# Patient Record
Sex: MALE | Race: BLACK OR AFRICAN AMERICAN | NOT HISPANIC OR LATINO | Employment: OTHER | URBAN - METROPOLITAN AREA
[De-identification: names, ages, dates, MRNs, and addresses within clinical notes are randomized per-mention and may not be internally consistent; named-entity substitution may affect disease eponyms.]

---

## 2017-07-24 ENCOUNTER — HISTORICAL (OUTPATIENT)
Dept: ENDOSCOPY | Facility: HOSPITAL | Age: 57
End: 2017-07-24

## 2017-07-26 ENCOUNTER — HISTORICAL (OUTPATIENT)
Dept: ENDOSCOPY | Facility: HOSPITAL | Age: 57
End: 2017-07-26

## 2018-05-16 ENCOUNTER — HISTORICAL (OUTPATIENT)
Dept: CARDIOLOGY | Facility: HOSPITAL | Age: 58
End: 2018-05-16

## 2018-05-16 LAB
ABS NEUT (OLG): 6.1 X10(3)/MCL (ref 2.1–9.2)
APTT PPP: 30.5 SECOND(S) (ref 24.8–36.9)
BASOPHILS # BLD AUTO: 0 X10(3)/MCL (ref 0–0.2)
BASOPHILS NFR BLD AUTO: 0 %
BUN SERPL-MCNC: 20 MG/DL (ref 7–18)
CALCIUM SERPL-MCNC: 9.3 MG/DL (ref 8.5–10.1)
CHLORIDE SERPL-SCNC: 108 MMOL/L (ref 98–107)
CO2 SERPL-SCNC: 24 MMOL/L (ref 21–32)
CREAT SERPL-MCNC: 1.03 MG/DL (ref 0.7–1.3)
CREAT/UREA NIT SERPL: 19.4
ERYTHROCYTE [DISTWIDTH] IN BLOOD BY AUTOMATED COUNT: 12.4 % (ref 11.5–17)
GLUCOSE SERPL-MCNC: 122 MG/DL (ref 74–106)
HCT VFR BLD AUTO: 37.9 % (ref 42–52)
HGB BLD-MCNC: 12.6 GM/DL (ref 14–18)
INR PPP: 0.88 (ref 0–1.27)
LYMPHOCYTES # BLD AUTO: 0.9 X10(3)/MCL (ref 0.6–4.6)
LYMPHOCYTES NFR BLD AUTO: 12 %
MCH RBC QN AUTO: 30.9 PG (ref 27–31)
MCHC RBC AUTO-ENTMCNC: 33.2 GM/DL (ref 33–36)
MCV RBC AUTO: 92.9 FL (ref 80–94)
MONOCYTES # BLD AUTO: 0.2 X10(3)/MCL (ref 0.1–1.3)
MONOCYTES NFR BLD AUTO: 2 %
NEUTROPHILS # BLD AUTO: 6.1 X10(3)/MCL (ref 2.1–9.2)
NEUTROPHILS NFR BLD AUTO: 85 %
PLATELET # BLD AUTO: 177 X10(3)/MCL (ref 130–400)
PMV BLD AUTO: 10.1 FL (ref 9.4–12.4)
POTASSIUM SERPL-SCNC: 4.5 MMOL/L (ref 3.5–5.1)
PROTHROMBIN TIME: 12.2 SECOND(S) (ref 12.2–14.7)
RBC # BLD AUTO: 4.08 X10(6)/MCL (ref 4.7–6.1)
SODIUM SERPL-SCNC: 139 MMOL/L (ref 136–145)
WBC # SPEC AUTO: 7.2 X10(3)/MCL (ref 4.5–11.5)

## 2018-06-13 ENCOUNTER — TELEPHONE (OUTPATIENT)
Dept: PHARMACY | Facility: CLINIC | Age: 58
End: 2018-06-13

## 2018-06-14 NOTE — TELEPHONE ENCOUNTER
Documentation Only:    Faxed Prior Authorization form and supporting documentation for Praluent to insurance on 06/14/2018.

## 2018-07-12 NOTE — TELEPHONE ENCOUNTER
2nd attempt: LM with Eunice at [facility] about Praluent Denial. Denial letter faxed on 6/25/18 - plan requires 6 month trial of Zetia. Pending MD response about how to proceed. TTN

## 2020-03-03 ENCOUNTER — HISTORICAL (OUTPATIENT)
Dept: CARDIOLOGY | Facility: HOSPITAL | Age: 60
End: 2020-03-03

## 2020-03-03 LAB
ABS NEUT (OLG): 6.55 X10(3)/MCL (ref 2.1–9.2)
BASOPHILS # BLD AUTO: 0 X10(3)/MCL (ref 0–0.2)
BASOPHILS NFR BLD AUTO: 0 %
BUN SERPL-MCNC: 13 MG/DL (ref 7–18)
CALCIUM SERPL-MCNC: 8.9 MG/DL (ref 8.5–10.1)
CHLORIDE SERPL-SCNC: 107 MMOL/L (ref 98–107)
CO2 SERPL-SCNC: 23 MMOL/L (ref 21–32)
CREAT SERPL-MCNC: 1.11 MG/DL (ref 0.7–1.3)
CREAT/UREA NIT SERPL: 11.7
EOSINOPHIL # BLD AUTO: 0 X10(3)/MCL (ref 0–0.9)
EOSINOPHIL NFR BLD AUTO: 0 %
ERYTHROCYTE [DISTWIDTH] IN BLOOD BY AUTOMATED COUNT: 13 % (ref 11.5–17)
GLUCOSE SERPL-MCNC: 167 MG/DL (ref 74–106)
HCT VFR BLD AUTO: 38.6 % (ref 42–52)
HGB BLD-MCNC: 13 GM/DL (ref 14–18)
INR PPP: 0.9 (ref 0–1.3)
LYMPHOCYTES # BLD AUTO: 0.8 X10(3)/MCL (ref 0.6–4.6)
LYMPHOCYTES NFR BLD AUTO: 10 %
MCH RBC QN AUTO: 31.5 PG (ref 27–31)
MCHC RBC AUTO-ENTMCNC: 33.7 GM/DL (ref 33–36)
MCV RBC AUTO: 93.5 FL (ref 80–94)
MONOCYTES # BLD AUTO: 0.4 X10(3)/MCL (ref 0.1–1.3)
MONOCYTES NFR BLD AUTO: 5 %
NEUTROPHILS # BLD AUTO: 6.55 X10(3)/MCL (ref 2.1–9.2)
NEUTROPHILS NFR BLD AUTO: 84 %
PLATELET # BLD AUTO: 154 X10(3)/MCL (ref 130–400)
PMV BLD AUTO: 10.4 FL (ref 9.4–12.4)
POTASSIUM SERPL-SCNC: 4.5 MMOL/L (ref 3.5–5.1)
PROTHROMBIN TIME: 11.5 SECOND(S) (ref 11.1–13.7)
RBC # BLD AUTO: 4.13 X10(6)/MCL (ref 4.7–6.1)
SODIUM SERPL-SCNC: 137 MMOL/L (ref 136–145)
WBC # SPEC AUTO: 7.8 X10(3)/MCL (ref 4.5–11.5)

## 2020-11-11 ENCOUNTER — HISTORICAL (OUTPATIENT)
Dept: ADMINISTRATIVE | Facility: HOSPITAL | Age: 60
End: 2020-11-11

## 2020-11-12 LAB
ABS NEUT (OLG): 9.94 X10(3)/MCL (ref 2.1–9.2)
ALBUMIN SERPL-MCNC: 3.6 GM/DL (ref 3.4–4.8)
ALBUMIN/GLOB SERPL: 1.2 RATIO (ref 1.1–2)
ALP SERPL-CCNC: 50 UNIT/L (ref 40–150)
ALT SERPL-CCNC: 21 UNIT/L (ref 0–55)
AST SERPL-CCNC: 21 UNIT/L (ref 5–34)
BASOPHILS # BLD AUTO: 0 X10(3)/MCL (ref 0–0.2)
BASOPHILS NFR BLD AUTO: 0 %
BILIRUB SERPL-MCNC: 0.3 MG/DL
BILIRUBIN DIRECT+TOT PNL SERPL-MCNC: 0.1 MG/DL (ref 0–0.5)
BILIRUBIN DIRECT+TOT PNL SERPL-MCNC: 0.2 MG/DL (ref 0–0.8)
BUN SERPL-MCNC: 17.8 MG/DL (ref 8.4–25.7)
CALCIUM SERPL-MCNC: 8.2 MG/DL (ref 8.8–10)
CHLORIDE SERPL-SCNC: 110 MMOL/L (ref 98–107)
CO2 SERPL-SCNC: 22 MMOL/L (ref 23–31)
CREAT SERPL-MCNC: 1 MG/DL (ref 0.73–1.18)
ERYTHROCYTE [DISTWIDTH] IN BLOOD BY AUTOMATED COUNT: 12.9 % (ref 11.5–17)
GLOBULIN SER-MCNC: 2.9 GM/DL (ref 2.4–3.5)
GLUCOSE SERPL-MCNC: 145 MG/DL (ref 82–115)
HCT VFR BLD AUTO: 38.3 % (ref 42–52)
HGB BLD-MCNC: 12.7 GM/DL (ref 14–18)
LYMPHOCYTES # BLD AUTO: 0.8 X10(3)/MCL (ref 0.6–4.6)
LYMPHOCYTES NFR BLD AUTO: 7 %
MCH RBC QN AUTO: 31 PG (ref 27–31)
MCHC RBC AUTO-ENTMCNC: 33.2 GM/DL (ref 33–36)
MCV RBC AUTO: 93.4 FL (ref 80–94)
MONOCYTES # BLD AUTO: 0.9 X10(3)/MCL (ref 0.1–1.3)
MONOCYTES NFR BLD AUTO: 7 %
NEUTROPHILS # BLD AUTO: 9.94 X10(3)/MCL (ref 2.1–9.2)
NEUTROPHILS NFR BLD AUTO: 85 %
PLATELET # BLD AUTO: 157 X10(3)/MCL (ref 130–400)
PMV BLD AUTO: 10.7 FL (ref 9.4–12.4)
POTASSIUM SERPL-SCNC: 4.7 MMOL/L (ref 3.5–5.1)
PROT SERPL-MCNC: 6.5 GM/DL (ref 5.8–7.6)
RBC # BLD AUTO: 4.1 X10(6)/MCL (ref 4.7–6.1)
SODIUM SERPL-SCNC: 141 MMOL/L (ref 136–145)
WBC # SPEC AUTO: 11.7 X10(3)/MCL (ref 4.5–11.5)

## 2020-12-10 PROBLEM — I73.9 PAD (PERIPHERAL ARTERY DISEASE): Status: ACTIVE | Noted: 2020-12-10

## 2021-01-14 PROBLEM — I70.219 ATHEROSCLEROSIS OF NATIVE ARTERIES OF EXTREMITY WITH INTERMITTENT CLAUDICATION: Status: ACTIVE | Noted: 2021-01-14

## 2021-09-13 ENCOUNTER — HISTORICAL (OUTPATIENT)
Dept: LAB | Facility: HOSPITAL | Age: 61
End: 2021-09-13

## 2021-09-13 LAB
ABS NEUT (OLG): 3.8 X10(3)/MCL (ref 2.1–9.2)
ALBUMIN SERPL-MCNC: 4.1 GM/DL (ref 3.4–4.8)
ALBUMIN/GLOB SERPL: 1.2 RATIO (ref 1.1–2)
ALP SERPL-CCNC: 54 UNIT/L (ref 40–150)
ALT SERPL-CCNC: 22 UNIT/L (ref 0–55)
AST SERPL-CCNC: 21 UNIT/L (ref 5–34)
BILIRUB SERPL-MCNC: 0.3 MG/DL (ref 0.2–1.2)
BILIRUBIN DIRECT+TOT PNL SERPL-MCNC: 0.1 MG/DL (ref 0–0.5)
BILIRUBIN DIRECT+TOT PNL SERPL-MCNC: 0.2 MG/DL (ref 0–0.8)
BUN SERPL-MCNC: 16.5 MG/DL (ref 8.4–25.7)
CALCIUM SERPL-MCNC: 10 MG/DL (ref 8.8–10)
CHLORIDE SERPL-SCNC: 109 MMOL/L (ref 98–107)
CHOLEST SERPL-MCNC: 208 MG/DL
CHOLEST/HDLC SERPL: 5 {RATIO} (ref 0–5)
CO2 SERPL-SCNC: 22 MMOL/L (ref 23–31)
CREAT SERPL-MCNC: 1.1 MG/DL (ref 0.72–1.25)
ERYTHROCYTE [DISTWIDTH] IN BLOOD BY AUTOMATED COUNT: 12.8 % (ref 11.5–17)
GLOBULIN SER-MCNC: 3.4 GM/DL (ref 2.4–3.5)
GLUCOSE SERPL-MCNC: 95 MG/DL (ref 82–115)
HCT VFR BLD AUTO: 40.1 % (ref 42–52)
HDLC SERPL-MCNC: 41 MG/DL (ref 40–60)
HGB BLD-MCNC: 13.6 GM/DL (ref 14–18)
LDLC SERPL CALC-MCNC: 108 MG/DL (ref 50–140)
MCH RBC QN AUTO: 31.9 PG (ref 27–31)
MCHC RBC AUTO-ENTMCNC: 33.9 GM/DL (ref 33–36)
MCV RBC AUTO: 93.9 FL (ref 80–94)
NRBC BLD AUTO-RTO: 0 % (ref 0–0.2)
PLATELET # BLD AUTO: 157 X10(3)/MCL (ref 130–400)
PMV BLD AUTO: 10.3 FL (ref 7.4–10.4)
POTASSIUM SERPL-SCNC: 4.7 MMOL/L (ref 3.5–5.1)
PROT SERPL-MCNC: 7.5 GM/DL (ref 5.8–7.6)
RBC # BLD AUTO: 4.27 X10(6)/MCL (ref 4.7–6.1)
SODIUM SERPL-SCNC: 142 MMOL/L (ref 136–145)
TRIGL SERPL-MCNC: 297 MG/DL (ref 0–150)
VLDLC SERPL CALC-MCNC: 59 MG/DL
WBC # SPEC AUTO: 6.5 X10(3)/MCL (ref 4.5–11.5)

## 2022-04-11 ENCOUNTER — HISTORICAL (OUTPATIENT)
Dept: ADMINISTRATIVE | Facility: HOSPITAL | Age: 62
End: 2022-04-11

## 2022-04-28 VITALS
DIASTOLIC BLOOD PRESSURE: 88 MMHG | BODY MASS INDEX: 27.56 KG/M2 | HEIGHT: 65 IN | WEIGHT: 165.38 LBS | SYSTOLIC BLOOD PRESSURE: 141 MMHG

## 2022-04-30 NOTE — H&P
Patient:   Yamil Escobedo             MRN: [id number]            FIN: [id number]               Age:   56 years     Sex:  Male     :  1960   Associated Diagnoses:   None   Author:   Faizan Bunch MD      56-year-old man with a history of coronary artery disease with multiple stents, peripheral vascular disease with multiple stents, CABG here for EGD for long-term GERD and epigastric discomfort.  He does have some postprandial burning and regurgitation along with belching and bloating.  He has been on Nexium for a few months and this has helped some but not enough.  He denies any dysphagia.  No prior EGDs.  He says he stopped his Pradaxa 2 days ago and he stopped his Plavix 5 days ago.      Review of Systems   Constitutional:  Negative except as documented in history of present illness.    Respiratory:  Negative except as documented in history of present illness.    Cardiovascular:  Negative except as documented in history of present illness.       Health Status   Allergies:    Allergic Reactions (Selected)  Severe  Iodine- Anaphylactic reaction-code blue and rash.  Severity Not Documented  Ceclor- Rash.  Ciprofloxacin- No reactions were documented.   Current medications:  (Selected)   Prescriptions  Prescribed  Aldactone 25 mg oral tablet: 25 mg = 1 tab(s), Oral, Daily, # 30 tab(s), 11 Refill(s)  Pradaxa 150 mg oral capsule: 150 mg = 1 cap(s), Oral, BID, # 60 cap(s), 0 Refill(s), other reason (Rx)  lisinopril 2.5 mg oral tablet: 2.5 mg = 1 tab(s), Oral, Daily, # 30 tab(s), 11 Refill(s)  nitroglycerin 0.4 mg sublingual TAB: 0.4 mg = 1 tab(s), SL, q5min, PRN PRN chest pain, # 100 tab(s), 11 Refill(s)  Documented Medications  Documented  FLUoxetine 20 mg oral tablet: 20 mg = 1 tab(s), Oral, Daily, # 30 tab(s), 0 Refill(s)  ISOSORB MONO TAB 30MG ER: 30 mg = 1 tab(s), Oral, Daily  Lasix 20 mg oral tablet: 40 mg = 2 tab(s), Oral, Daily, PRN PRN edema, # 30 tab(s), 0 Refill(s), Patient Verbalizes  Understanding  Nitro-Bid 2% transdermal oint: 0.5 in, TOP, At Bedtime, 0 Refill(s)  Plavix 75 mg oral tablet: 75 mg = 1 tab(s), Oral, Daily, # 30 tab(s), 0 Refill(s)  Zantac: = 1 tab(s), Oral, TID, 0 Refill(s)  alPRAzolam 1 mg oral tablet: 1 mg = 1 tab(s), Oral, BID, PRN PRN for anxiety, 0 Refill(s)  amitriptyline 25 mg oral tablet: 50 mg = 2 tab(s), Oral, Once a day (at bedtime), # 180 tab(s), 0 Refill(s)  amlodipine 10 mg oral tablet: 10 mg = 1 tab(s), Oral, Daily, # 30 tab(s), 0 Refill(s)  aspirin 81 mg oral Delayed Release (EC) tablet: 81 mg = 1 tab(s), Oral, Daily, # 30 tab(s), 0 Refill(s)  atorvastatin 80 mg oral tablet: 80 mg = 1 tab(s), Oral, At Bedtime, # 90 tab(s), 0 Refill(s)  carvedilol 12.5 mg oral tablet: 12.5 mg = 1 tab(s), Oral, BID, # 60 tab(s), 0 Refill(s)  esomeprazole 40 mg oral DR capsule (pt. own): 40 mg = 1 cap(s), Oral, Daily, # 30 cap(s), 0 Refill(s)  hydrALAZINE 50 mg oral tablet: 50 mg = 1 tab(s), Oral, TID, # 90 tab(s), 0 Refill(s)  nitroglycerin 0.4 mg sublingual TAB: 0.4 mg = 1 tab(s), SL, q5min, PRN PRN chest pain, 0 Refill(s)  zinc (as gluconate) 50 mg oral tablet: = 1 tab(s), Oral, Daily, # 30 tab(s), 0 Refill(s)      Histories   Past Medical History:    Active  High blood pressure ([id number]): Onset in 1987 at 27 years.  Comments:  1/20/2016 CST 21:36 ANJUM - Jaya MARS, Bertha MARTINEZ  on htn med   Procedure history:    PTCA - Percutaneous transluminal coronary angioplasty ([id number]) on 3/19/2015 at 54 Years.  Comments:  11/17/2015 11:29 - Nancy Rowley  x3  re-op right fem-pop bypass/profundaplasty on 8/8/2014 at 53 Years.  Above knee femoral-popliteal artery bypass using autogenous vein graft ([id number]) on 9/5/2013 at 52 Years.  Comments:  11/17/2015 11:29 - Nancy Rowley  right  Above knee femoral-popliteal artery bypass using autogenous vein graft ([id number]) on 6/4/2013 at 52 Years.  Comments:  11/17/2015 11:30 - Nancy Rowley  left  Above knee femoral-popliteal  artery bypass using autogenous vein graft ([id number]) on 6/25/2010 at 49 Years.  CABG x 4 - Coronary artery bypass grafts x 4 ([id number]) on 3/6/2009 at 48 Years.  cardiac ablation x2.  Appendectomy (225).  Coronary artery bypass grafts x 5 ([id number]).  Femoral-popliteal artery bypass graft with vein ([id number]).      Physical Examination      Vital Signs (last 24 hrs)_____  Last Charted___________  Temp Tympanic    36.5 DegC  (JUL 24 07:03)  Resp Rate         18 br/min  (JUL 24 07:03)  SBP      136 mmHg  (JUL 24 07:03)  DBP      74 mmHg  (JUL 24 07:03)  SpO2      100 %  (JUL 24 07:03)     General:  Alert and oriented, No acute distress.    Eye:  Pupils are equal, round and reactive to light.    HENT:  Oral mucosa is moist.    Neck:  Supple.    Respiratory:  Respirations are non-labored, Symmetrical chest wall expansion.    Cardiovascular:  S1, S2.    Gastrointestinal:  Soft, Non-tender, Non-distended, Normal bowel sounds.    Neurologic:  Alert, Oriented.    Psychiatric:  Cooperative, Appropriate mood & affect.       Review / Management   Results review:     No qualifying data available.       Impression and Plan   1. GERD---plan  for EGD.

## 2022-04-30 NOTE — H&P
Patient:   Yamil Escobedo             MRN: [id number]            FIN: [id number]               Age:   56 years     Sex:  Male     :  1960   Associated Diagnoses:   None   Author:   Faizan Bunch MD      56-year-old man with coronary artery disease, peripheral vascular disease and multiple stents on aspirin and Plavix and Pradaxa who is here for colonoscopy for rectal bleeding.  He is also never had a screening.  The rectal bleeding is been going on for a month or 2 intermittently.  He has stopped his Plavix and Pradaxa for the past few days.  He was recently here for an EGD which was unremarkable except for gastritis.  He denies any family history of colon cancer.      Review of Systems   Constitutional:  Negative except as documented in history of present illness.    Ear/Nose/Mouth/Throat:  Negative except as documented in history of present illness.    Respiratory:  Negative except as documented in history of present illness.    Cardiovascular:  Negative except as documented in history of present illness.       Health Status   Allergies:    Allergic Reactions (Selected)  Severe  Iodine- Anaphylactic reaction-code blue and rash.  Severity Not Documented  Ceclor- Rash.  Ciprofloxacin- No reactions were documented.   Current medications:  (Selected)   Inpatient Medications  Ordered  Buffered Lidocaine 1% - 1mL syringe: 0.5 mL, 5 mg =, form: Injection, ID, As Directed PRN for other (see comment), first dose 17 11:29:00 CDT, May inject 0.5mL at IV site, if not allergic  Plasmalyte 1,000 mL: 1,000 mL, 1,000 mL, IV, 20 mL/hr, start date 17 11:29:00 CDT  citric acid-sodium citrate 334 mg-500 mg/5 mL oral solution: 30 mL, form: Soln, Oral, Once, first dose 17 9:00:00 CDT, stop date 17 9:00:00 CDT, administer if obese (BMI>30) or diabetic or has GERD/reflux or is not NPO  midazolam: 2 mg, form: Injection, IV Push, q5min, Order duration: 2 dose(s), first dose 17 11:29:00  CDT, stop date 07/26/17 11:38:00 CDT, (up to 5 mg for moderate anxiety), 30,025  Prescriptions  Prescribed  Aldactone 25 mg oral tablet: 25 mg = 1 tab(s), Oral, Daily, # 30 tab(s), 11 Refill(s)  Pradaxa 150 mg oral capsule: 150 mg = 1 cap(s), Oral, BID, # 60 cap(s), 0 Refill(s), other reason (Rx)  lisinopril 2.5 mg oral tablet: 2.5 mg = 1 tab(s), Oral, Daily, # 30 tab(s), 11 Refill(s)  nitroglycerin 0.4 mg sublingual TAB: 0.4 mg = 1 tab(s), SL, q5min, PRN PRN chest pain, # 100 tab(s), 11 Refill(s)  Documented Medications  Documented  FLUoxetine 20 mg oral tablet: 20 mg = 1 tab(s), Oral, Daily, # 30 tab(s), 0 Refill(s)  ISOSORB MONO TAB 30MG ER: 30 mg = 1 tab(s), Oral, Daily  Lasix 20 mg oral tablet: 40 mg = 2 tab(s), Oral, Daily, PRN PRN edema, # 30 tab(s), 0 Refill(s), Patient Verbalizes Understanding  Nitro-Bid 2% transdermal oint: 0.5 in, TOP, At Bedtime, 0 Refill(s)  Plavix 75 mg oral tablet: 75 mg = 1 tab(s), Oral, Daily, # 30 tab(s), 0 Refill(s)  Zantac: = 1 tab(s), Oral, TID, 0 Refill(s)  alPRAzolam 1 mg oral tablet: 1 mg = 1 tab(s), Oral, BID, PRN PRN for anxiety, 0 Refill(s)  amitriptyline 25 mg oral tablet: 50 mg = 2 tab(s), Oral, Once a day (at bedtime), # 180 tab(s), 0 Refill(s)  amlodipine 10 mg oral tablet: 10 mg = 1 tab(s), Oral, Daily, # 30 tab(s), 0 Refill(s)  aspirin 81 mg oral Delayed Release (EC) tablet: 81 mg = 1 tab(s), Oral, Daily, # 30 tab(s), 0 Refill(s)  atorvastatin 80 mg oral tablet: 80 mg = 1 tab(s), Oral, At Bedtime, # 90 tab(s), 0 Refill(s)  carvedilol 12.5 mg oral tablet: 12.5 mg = 1 tab(s), Oral, BID, # 60 tab(s), 0 Refill(s)  esomeprazole 40 mg oral DR capsule (pt. own): 40 mg = 1 cap(s), Oral, Daily, # 30 cap(s), 0 Refill(s)  hydrALAZINE 50 mg oral tablet: 50 mg = 1 tab(s), Oral, TID, # 90 tab(s), 0 Refill(s)  nitroglycerin 0.4 mg sublingual TAB: 0.4 mg = 1 tab(s), SL, q5min, PRN PRN chest pain, 0 Refill(s)  zinc (as gluconate) 50 mg oral tablet: = 1 tab(s), Oral, Daily, # 30  tab(s), 0 Refill(s)      Histories   Past Medical History:    Active  High blood pressure ([id number]): Onset in 1987 at 27 years.  Comments:  1/20/2016 CST 21:36 CST - Jaya MARS, Bertha MARTINEZ  on htn med   Procedure history:    Esophagogastroduodenoscopy on 7/24/2017 at 56 Years.  Comments:  7/24/2017 08:00 - Romain Albrecht RN  auto-populated from documented surgical case  Biopsy Gastrointestinal on 7/24/2017 at 56 Years.  Comments:  7/24/2017 08:00 - Romain Albrecht RN  auto-populated from documented surgical case  PTCA - Percutaneous transluminal coronary angioplasty ([id number]) on 3/19/2015 at 54 Years.  Comments:  11/17/2015 11:29 - Nancy Rowley  x3  re-op right fem-pop bypass/profundaplasty on 8/8/2014 at 53 Years.  Above knee femoral-popliteal artery bypass using autogenous vein graft ([id number]) on 9/5/2013 at 52 Years.  Comments:  11/17/2015 11:29 - Nancy Rowley  right  Above knee femoral-popliteal artery bypass using autogenous vein graft ([id number]) on 6/4/2013 at 52 Years.  Comments:  11/17/2015 11:30 - Nancy Rowley.  left  Above knee femoral-popliteal artery bypass using autogenous vein graft ([id number]) on 6/25/2010 at 49 Years.  CABG x 4 - Coronary artery bypass grafts x 4 ([id number]) on 3/6/2009 at 48 Years.  cardiac ablation x2.  Appendectomy (225).  Coronary artery bypass grafts x 5 ([id number]).  Femoral-popliteal artery bypass graft with vein ([id number]).      Physical Examination      Vital Signs (last 24 hrs)_____  Last Charted___________  Temp Tympanic    36.3 DegC  (JUL 26 11:21)  Heart Rate Peripheral   76 bpm  (JUL 26 11:21)  Resp Rate         21 br/min  (JUL 26 11:21)  SBP      138 mmHg  (JUL 26 11:21)  DBP      90 mmHg  (JUL 26 11:21)  SpO2      100 %  (JUL 26 11:21)     General:  Alert and oriented, No acute distress.    Eye:  Pupils are equal, round and reactive to light.    HENT:  Oral mucosa is moist.    Neck:  Supple.    Respiratory:  Respirations are non-labored,  Symmetrical chest wall expansion.    Cardiovascular:  S1, S2.    Gastrointestinal:  Soft, Non-tender, Non-distended, Normal bowel sounds.    Neurologic:  Alert, Oriented.    Psychiatric:  Cooperative, Appropriate mood & affect.       Review / Management   Results review:     No qualifying data available.       Impression and Plan   Rectal bleeding-----this is likely hemorrhoidal in the setting of Plavix and Pradaxa    Plan for colonoscopy

## 2023-04-08 ENCOUNTER — HOSPITAL ENCOUNTER (INPATIENT)
Facility: HOSPITAL | Age: 63
LOS: 6 days | Discharge: HOME | DRG: 253 | End: 2023-04-14
Attending: EMERGENCY MEDICINE | Admitting: INTERNAL MEDICINE
Payer: MEDICARE

## 2023-04-08 DIAGNOSIS — T82.898S: Primary | ICD-10-CM

## 2023-04-08 DIAGNOSIS — I74.9 EMBOLISM AND THROMBOSIS: ICD-10-CM

## 2023-04-08 DIAGNOSIS — N17.9 AKI (ACUTE KIDNEY INJURY): ICD-10-CM

## 2023-04-08 DIAGNOSIS — I73.9 PAD (PERIPHERAL ARTERY DISEASE): ICD-10-CM

## 2023-04-08 DIAGNOSIS — M79.606 LEG PAIN: ICD-10-CM

## 2023-04-08 LAB
ALBUMIN SERPL-MCNC: 4.2 G/DL (ref 3.4–4.8)
ALBUMIN/GLOB SERPL: 1.8 RATIO (ref 1.1–2)
ALP SERPL-CCNC: 46 UNIT/L (ref 40–150)
ALT SERPL-CCNC: 26 UNIT/L (ref 0–55)
APTT PPP: 139.3 SECONDS (ref 23.2–33.7)
APTT PPP: 31.6 SECONDS (ref 23.2–33.7)
AST SERPL-CCNC: 31 UNIT/L (ref 5–34)
BASOPHILS # BLD AUTO: 0.02 X10(3)/MCL (ref 0–0.2)
BASOPHILS NFR BLD AUTO: 0.4 %
BILIRUBIN DIRECT+TOT PNL SERPL-MCNC: 0.5 MG/DL
BUN SERPL-MCNC: 19.6 MG/DL (ref 8.4–25.7)
CALCIUM SERPL-MCNC: 9.5 MG/DL (ref 8.8–10)
CHLORIDE SERPL-SCNC: 110 MMOL/L (ref 98–107)
CO2 SERPL-SCNC: 22 MMOL/L (ref 23–31)
CREAT SERPL-MCNC: 1.21 MG/DL (ref 0.73–1.18)
EOSINOPHIL # BLD AUTO: 0.08 X10(3)/MCL (ref 0–0.9)
EOSINOPHIL NFR BLD AUTO: 1.6 %
ERYTHROCYTE [DISTWIDTH] IN BLOOD BY AUTOMATED COUNT: 12.1 % (ref 11.5–17)
GFR SERPLBLD CREATININE-BSD FMLA CKD-EPI: >60 MLS/MIN/1.73/M2
GLOBULIN SER-MCNC: 2.4 GM/DL (ref 2.4–3.5)
GLUCOSE SERPL-MCNC: 102 MG/DL (ref 82–115)
HCT VFR BLD AUTO: 36.1 % (ref 42–52)
HGB BLD-MCNC: 12.5 G/DL (ref 14–18)
IMM GRANULOCYTES # BLD AUTO: 0.01 X10(3)/MCL (ref 0–0.04)
IMM GRANULOCYTES NFR BLD AUTO: 0.2 %
INR BLD: 0.97 (ref 0–1.3)
LYMPHOCYTES # BLD AUTO: 1.31 X10(3)/MCL (ref 0.6–4.6)
LYMPHOCYTES NFR BLD AUTO: 26.7 %
MCH RBC QN AUTO: 31.3 PG (ref 27–31)
MCHC RBC AUTO-ENTMCNC: 34.6 G/DL (ref 33–36)
MCV RBC AUTO: 90.3 FL (ref 80–94)
MONOCYTES # BLD AUTO: 0.59 X10(3)/MCL (ref 0.1–1.3)
MONOCYTES NFR BLD AUTO: 12 %
NEUTROPHILS # BLD AUTO: 2.89 X10(3)/MCL (ref 2.1–9.2)
NEUTROPHILS NFR BLD AUTO: 59.1 %
NRBC BLD AUTO-RTO: 0 %
PLATELET # BLD AUTO: 138 X10(3)/MCL (ref 130–400)
PMV BLD AUTO: 10.5 FL (ref 7.4–10.4)
POTASSIUM SERPL-SCNC: 4.8 MMOL/L (ref 3.5–5.1)
PROT SERPL-MCNC: 6.6 GM/DL (ref 5.8–7.6)
PROTHROMBIN TIME: 12.8 SECONDS (ref 12.5–14.5)
RBC # BLD AUTO: 4 X10(6)/MCL (ref 4.7–6.1)
SODIUM SERPL-SCNC: 142 MMOL/L (ref 136–145)
WBC # SPEC AUTO: 4.9 X10(3)/MCL (ref 4.5–11.5)

## 2023-04-08 PROCEDURE — 11000001 HC ACUTE MED/SURG PRIVATE ROOM

## 2023-04-08 PROCEDURE — 63600175 PHARM REV CODE 636 W HCPCS: Performed by: EMERGENCY MEDICINE

## 2023-04-08 PROCEDURE — 85610 PROTHROMBIN TIME: CPT | Performed by: STUDENT IN AN ORGANIZED HEALTH CARE EDUCATION/TRAINING PROGRAM

## 2023-04-08 PROCEDURE — 93010 ELECTROCARDIOGRAM REPORT: CPT | Mod: ,,, | Performed by: INTERNAL MEDICINE

## 2023-04-08 PROCEDURE — 80053 COMPREHEN METABOLIC PANEL: CPT | Performed by: STUDENT IN AN ORGANIZED HEALTH CARE EDUCATION/TRAINING PROGRAM

## 2023-04-08 PROCEDURE — 85025 COMPLETE CBC W/AUTO DIFF WBC: CPT | Performed by: STUDENT IN AN ORGANIZED HEALTH CARE EDUCATION/TRAINING PROGRAM

## 2023-04-08 PROCEDURE — 93010 EKG 12-LEAD: ICD-10-PCS | Mod: ,,, | Performed by: INTERNAL MEDICINE

## 2023-04-08 PROCEDURE — 99285 EMERGENCY DEPT VISIT HI MDM: CPT | Mod: 25

## 2023-04-08 PROCEDURE — 96375 TX/PRO/DX INJ NEW DRUG ADDON: CPT

## 2023-04-08 PROCEDURE — 63600175 PHARM REV CODE 636 W HCPCS: Performed by: STUDENT IN AN ORGANIZED HEALTH CARE EDUCATION/TRAINING PROGRAM

## 2023-04-08 PROCEDURE — 85730 THROMBOPLASTIN TIME PARTIAL: CPT | Performed by: STUDENT IN AN ORGANIZED HEALTH CARE EDUCATION/TRAINING PROGRAM

## 2023-04-08 PROCEDURE — 85730 THROMBOPLASTIN TIME PARTIAL: CPT | Performed by: EMERGENCY MEDICINE

## 2023-04-08 PROCEDURE — 25500020 PHARM REV CODE 255: Performed by: EMERGENCY MEDICINE

## 2023-04-08 PROCEDURE — 93005 ELECTROCARDIOGRAM TRACING: CPT

## 2023-04-08 PROCEDURE — 96374 THER/PROPH/DIAG INJ IV PUSH: CPT | Mod: 59

## 2023-04-08 PROCEDURE — 25000003 PHARM REV CODE 250: Performed by: STUDENT IN AN ORGANIZED HEALTH CARE EDUCATION/TRAINING PROGRAM

## 2023-04-08 RX ORDER — SODIUM CHLORIDE, SODIUM LACTATE, POTASSIUM CHLORIDE, CALCIUM CHLORIDE 600; 310; 30; 20 MG/100ML; MG/100ML; MG/100ML; MG/100ML
INJECTION, SOLUTION INTRAVENOUS CONTINUOUS
Status: DISCONTINUED | OUTPATIENT
Start: 2023-04-08 | End: 2023-04-12

## 2023-04-08 RX ORDER — HEPARIN SODIUM,PORCINE/D5W 25000/250
0-40 INTRAVENOUS SOLUTION INTRAVENOUS CONTINUOUS
Status: DISCONTINUED | OUTPATIENT
Start: 2023-04-08 | End: 2023-04-10

## 2023-04-08 RX ORDER — POLYETHYLENE GLYCOL 3350 17 G/17G
17 POWDER, FOR SOLUTION ORAL DAILY
Status: DISCONTINUED | OUTPATIENT
Start: 2023-04-08 | End: 2023-04-14 | Stop reason: HOSPADM

## 2023-04-08 RX ORDER — ONDANSETRON 2 MG/ML
4 INJECTION INTRAMUSCULAR; INTRAVENOUS EVERY 4 HOURS PRN
Status: DISCONTINUED | OUTPATIENT
Start: 2023-04-08 | End: 2023-04-14 | Stop reason: HOSPADM

## 2023-04-08 RX ORDER — CILOSTAZOL 100 MG/1
50 TABLET ORAL 2 TIMES DAILY
Status: ON HOLD | COMMUNITY
End: 2023-04-14 | Stop reason: HOSPADM

## 2023-04-08 RX ORDER — DIPHENHYDRAMINE HYDROCHLORIDE 50 MG/ML
25 INJECTION INTRAMUSCULAR; INTRAVENOUS
Status: COMPLETED | OUTPATIENT
Start: 2023-04-08 | End: 2023-04-08

## 2023-04-08 RX ORDER — MORPHINE SULFATE 4 MG/ML
8 INJECTION, SOLUTION INTRAMUSCULAR; INTRAVENOUS EVERY 6 HOURS PRN
Status: DISCONTINUED | OUTPATIENT
Start: 2023-04-08 | End: 2023-04-14 | Stop reason: HOSPADM

## 2023-04-08 RX ORDER — HYDRALAZINE HYDROCHLORIDE 20 MG/ML
10 INJECTION INTRAMUSCULAR; INTRAVENOUS
Status: DISCONTINUED | OUTPATIENT
Start: 2023-04-08 | End: 2023-04-14 | Stop reason: HOSPADM

## 2023-04-08 RX ORDER — FAMOTIDINE 10 MG/ML
20 INJECTION INTRAVENOUS
Status: COMPLETED | OUTPATIENT
Start: 2023-04-08 | End: 2023-04-08

## 2023-04-08 RX ORDER — MORPHINE SULFATE 4 MG/ML
6 INJECTION, SOLUTION INTRAMUSCULAR; INTRAVENOUS
Status: COMPLETED | OUTPATIENT
Start: 2023-04-08 | End: 2023-04-08

## 2023-04-08 RX ORDER — AMLODIPINE BESYLATE 5 MG/1
10 TABLET ORAL DAILY
Status: DISCONTINUED | OUTPATIENT
Start: 2023-04-09 | End: 2023-04-14

## 2023-04-08 RX ORDER — MORPHINE SULFATE 15 MG/1
30 TABLET ORAL 2 TIMES DAILY
Status: DISCONTINUED | OUTPATIENT
Start: 2023-04-08 | End: 2023-04-08

## 2023-04-08 RX ORDER — HYDRALAZINE HYDROCHLORIDE 50 MG/1
100 TABLET, FILM COATED ORAL 3 TIMES DAILY
Status: DISCONTINUED | OUTPATIENT
Start: 2023-04-08 | End: 2023-04-14 | Stop reason: HOSPADM

## 2023-04-08 RX ORDER — TAMSULOSIN HYDROCHLORIDE 0.4 MG/1
1 CAPSULE ORAL DAILY
COMMUNITY
Start: 2023-03-10

## 2023-04-08 RX ORDER — TALC
6 POWDER (GRAM) TOPICAL NIGHTLY PRN
Status: DISCONTINUED | OUTPATIENT
Start: 2023-04-08 | End: 2023-04-14 | Stop reason: HOSPADM

## 2023-04-08 RX ORDER — MORPHINE SULFATE 15 MG/1
30 TABLET ORAL EVERY 6 HOURS PRN
Status: DISCONTINUED | OUTPATIENT
Start: 2023-04-08 | End: 2023-04-14 | Stop reason: HOSPADM

## 2023-04-08 RX ORDER — CARVEDILOL 12.5 MG/1
25 TABLET ORAL 2 TIMES DAILY WITH MEALS
Status: DISCONTINUED | OUTPATIENT
Start: 2023-04-08 | End: 2023-04-14 | Stop reason: HOSPADM

## 2023-04-08 RX ORDER — SODIUM CHLORIDE 0.9 % (FLUSH) 0.9 %
10 SYRINGE (ML) INJECTION
Status: DISCONTINUED | OUTPATIENT
Start: 2023-04-08 | End: 2023-04-14 | Stop reason: HOSPADM

## 2023-04-08 RX ADMIN — SACUBITRIL AND VALSARTAN 1 TABLET: 49; 51 TABLET, FILM COATED ORAL at 09:04

## 2023-04-08 RX ADMIN — HYDRALAZINE HYDROCHLORIDE 100 MG: 50 TABLET, FILM COATED ORAL at 09:04

## 2023-04-08 RX ADMIN — MORPHINE SULFATE 6 MG: 4 INJECTION INTRAVENOUS at 10:04

## 2023-04-08 RX ADMIN — HEPARIN SODIUM 18 UNITS/KG/HR: 10000 INJECTION, SOLUTION INTRAVENOUS at 11:04

## 2023-04-08 RX ADMIN — CARVEDILOL 25 MG: 12.5 TABLET, FILM COATED ORAL at 07:04

## 2023-04-08 RX ADMIN — IOPAMIDOL 100 ML: 755 INJECTION, SOLUTION INTRAVENOUS at 11:04

## 2023-04-08 RX ADMIN — SODIUM CHLORIDE, POTASSIUM CHLORIDE, SODIUM LACTATE AND CALCIUM CHLORIDE: 600; 310; 30; 20 INJECTION, SOLUTION INTRAVENOUS at 07:04

## 2023-04-08 RX ADMIN — HYDRALAZINE HYDROCHLORIDE 100 MG: 50 TABLET, FILM COATED ORAL at 03:04

## 2023-04-08 RX ADMIN — DIPHENHYDRAMINE HYDROCHLORIDE 25 MG: 50 INJECTION, SOLUTION INTRAMUSCULAR; INTRAVENOUS at 10:04

## 2023-04-08 RX ADMIN — ONDANSETRON 4 MG: 2 INJECTION INTRAMUSCULAR; INTRAVENOUS at 03:04

## 2023-04-08 RX ADMIN — FAMOTIDINE 20 MG: 10 INJECTION, SOLUTION INTRAVENOUS at 10:04

## 2023-04-08 NOTE — ED PROVIDER NOTES
Encounter Date: 4/8/2023       History     Chief Complaint   Patient presents with    Leg Pain     Right leg pain since 0400 this am. Hx Right leg stent x2, swelling/redness not noted, PMS intact. Hypertensive in triage, bp meds not taken this AM     This is a 62-year-old male with a history of HTN, CAD, PAD, PVD, CABG x 4 2009, fem pop x 4, Afib on Plavix and Xarelto who presented with right leg pain 0400.  Onset on waking up with initial ambulation.  Described as severe 8/10, constant, radiation from thigh to calf. Associated numbness of RLE. Took Morphine 30mg without relief. States pain is similar to previous presentation that required fem pop. He is known to [facility], Dr. Melvin Martinez in [place]. Last dose of Xarelto 4/7/2023 1000.      Review of patient's allergies indicates:   Allergen Reactions    Ciprofloxacin     Iodinated contrast media      N & V.; CONFUSION    Sulfa (sulfonamide antibiotics)      Past Medical History:   Diagnosis Date    A-fib     Acid reflux     Acid reflux     Anemia     Anticoagulant long-term use     Anxiety     Cardiac arrest     Claudication     Coronary artery disease     Depression     DVT (deep venous thrombosis)     Embolus     Encounter for blood transfusion     Foot drop, right     Hemorrhoids     HLD (hyperlipidemia)     Hypertension     Insomnia     MI (myocardial infarction)     Numbness     R GREAT TOE ; PARTIAL LEFT FOOT    PAD (peripheral artery disease)     PVD (peripheral vascular disease)     Rhabdomyolysis     TIA (transient ischemic attack)     UTI (urinary tract infection)      Past Surgical History:   Procedure Laterality Date    ABLATION OF ARRHYTHMOGENIC FOCUS FOR ATRIAL FIBRILLATION      X 3    APPENDECTOMY      CABG X 4      CARDIAC ANGIOGRAM WITH STENTS      CARDIAC ARREST      DURING FEMORAL BYPASSES; 3 TIMES DURING CABG    EP STUDY      FEMORAL BYPASS Bilateral     TIMES 3    LASER ATHERECTOMY LEFT FEM-POP BYPASS GRAFT      PERCUTANEOUS TRANSLUMINAL ANGIOPLASTY  (PTA) OF PERIPHERAL VESSEL N/A 2021    Procedure: PTA, PERIPHERAL VESSEL;  Surgeon: Melvin Martinez MD;  Location: [facility] CATH;  Service: Cardiology;  Laterality: N/A;    PERCUTANEOUS TRANSLUMINAL ANGIOPLASTY (PTA) OF PERIPHERAL VESSEL Left 2021    Procedure: PTA, PERIPHERAL VESSEL of left SFA utilizing distal protection;  Surgeon: Melvin Martinez MD;  Location: [facility] CATH;  Service: Cardiology;  Laterality: Left;     Family History   Problem Relation Age of Onset    Cancer Father         LUNG     Social History     Tobacco Use    Smoking status: Former     Types: Cigarettes     Quit date:      Years since quittin.2   Substance Use Topics    Alcohol use: Not Currently     Comment: QUIT A LONG TIME AGO    Drug use: Not Currently     Review of Systems   Constitutional:  Negative for diaphoresis and fever.   HENT:  Negative for congestion.    Respiratory:  Negative for shortness of breath and wheezing.    Cardiovascular:  Negative for chest pain, palpitations and leg swelling.   Gastrointestinal:  Negative for abdominal pain, blood in stool and constipation.   Genitourinary:  Negative for dysuria, flank pain and hematuria.   Musculoskeletal:  Negative for arthralgias, back pain, gait problem and joint swelling.   Skin:  Negative for rash and wound.   Neurological:  Negative for dizziness, weakness, light-headedness and headaches.     Physical Exam     Initial Vitals [23 0802]   BP Pulse Resp Temp SpO2   (!) 190/98 78 18 97.9 °F (36.6 °C) 98 %      MAP       --         Physical Exam    Vitals reviewed.  Constitutional: He is not diaphoretic. No distress.   HENT:   Head: Normocephalic and atraumatic.   Eyes: EOM are normal. Pupils are equal, round, and reactive to light.   Neck: Neck supple.   Normal range of motion.  Cardiovascular:  Normal rate and regular rhythm.           Murmur heard.  Pulses:       Radial pulses are 2+ on the right side and 2+ on the left side.        Dorsalis pedis pulses are  1+ on the right side and 0 on the left side.   Abdominal: Abdomen is soft. Bowel sounds are normal. He exhibits no distension. There is no abdominal tenderness. There is no rebound.   Musculoskeletal:         General: Normal range of motion.      Cervical back: Normal range of motion and neck supple.     Neurological: He is alert and oriented to person, place, and time.   Skin: Skin is warm and dry. Capillary refill takes less than 2 seconds. No erythema. No pallor.       ED Course   Procedures  Labs Reviewed   COMPREHENSIVE METABOLIC PANEL - Abnormal; Notable for the following components:       Result Value    Chloride 110 (*)     Carbon Dioxide 22 (*)     Creatinine 1.21 (*)     All other components within normal limits   CBC WITH DIFFERENTIAL - Abnormal; Notable for the following components:    RBC 4.00 (*)     Hgb 12.5 (*)     Hct 36.1 (*)     MCH 31.3 (*)     MPV 10.5 (*)     All other components within normal limits   APTT - Normal   PROTIME-INR - Normal   CBC W/ AUTO DIFFERENTIAL    Narrative:     The following orders were created for panel order CBC Auto Differential.  Procedure                               Abnormality         Status                     ---------                               -----------         ------                     CBC with Differential[[id number]]        Abnormal            Final result                 Please view results for these tests on the individual orders.     EKG Readings: (Independently Interpreted)   Initial Reading: No STEMI. Rhythm: Normal Sinus Rhythm. Heart Rate: 72. Axis: Normal. Clinical Impression: Left Ventricular Hypertrophy (LDH) Other Impression: Normal intervals     Imaging Results              CTA Lower Extremity Right (Final result)  Result time 04/08/23 12:03:15      Final result by Arina Lagos MD (04/08/23 12:03:15)                   Impression:      Occluded right femoral vein grafts with scattered flow in the deep femoral artery, popliteal artery,  posterior tibial and peroneal arteries.      Electronically signed by: Arina Lagos  Date:    04/08/2023  Time:    12:03               Narrative:    EXAMINATION:  CTA LOWER EXTREMITY RIGHT    CLINICAL HISTORY:  right leg pain, history of PAD, fem pop x 4;    TECHNIQUE:  Axial images were obtained through the right lower extremity with contrast in the arterial phase.    Coronal, sagittal, MIP and 3-D reconstructions obtained from the axial data set.    Radiation Dose:    Total DLP: 1302 mGy*cm    COMPARISON:  None available    FINDINGS:  There are extensive calcifications of the distal abdominal aorta and iliac branches.  There is moderate multifocal narrowing of the bilateral internal and external iliac arteries.  Bilateral external iliac artery stents are in place.    There is no flow within the right femoral vein grafts.  Flow is seen within the deep femoral artery with moderate multifocal narrowing.  There is some reconstitution of flow within the popliteal artery which, with a stent at the tibioperoneal trunk.  Flow is seen within the posterior tibial and peroneal arteries to the level of the ankle.  There are extensive calcifications along the anterior temporal artery with minimal flow identified.    The left femoral artery graft appears to be patent.  The popliteal artery and tibioperoneal trunk are patent with calcifications.  The anterior tibial artery and peroneal artery appear patent.  The posterior tibial artery is not well opacified.                                       Medications   heparin 25,000 units in dextrose 5% 250 mL (100 units/mL) infusion HIGH INTENSITY nomogram - LAF (18 Units/kg/hr × 66.5 kg (Adjusted) Intravenous New Bag 4/8/23 3333)   heparin 25,000 units in dextrose 5% (100 units/ml) IV bolus from bag - ADDITIONAL PRN BOLUS - 60 units/kg (has no administration in time range)   heparin 25,000 units in dextrose 5% (100 units/ml) IV bolus from bag - ADDITIONAL PRN BOLUS - 30 units/kg  (has no administration in time range)   sodium chloride 0.9% flush 10 mL (has no administration in time range)   melatonin tablet 6 mg (has no administration in time range)   polyethylene glycol packet 17 g (has no administration in time range)   lactated ringers infusion (has no administration in time range)   hydrALAZINE injection 10 mg (has no administration in time range)   morphine injection 8 mg (has no administration in time range)   diphenhydrAMINE injection 25 mg (25 mg Intravenous Given 4/8/23 1029)   famotidine (PF) injection 20 mg (20 mg Intravenous Given 4/8/23 1029)   morphine injection 6 mg (6 mg Intravenous Given 4/8/23 1029)   heparin 25,000 units in dextrose 5% (100 units/ml) IV bolus from bag INITIAL BOLUS (5,320 Units Intravenous Bolus from Bag 4/8/23 1150)   iopamidoL (ISOVUE-370) injection 100 mL (100 mLs Intravenous Given 4/8/23 1149)                 ED Course as of 04/08/23 1304   Sat Apr 08, 2023   1040 Patient reports an allergic reactions of  hallucinations, n/v to contrast dye after a procedure. Routinely premedicated before IV contrasts and tolerates well. Will do the same before obtaining CTA  [CE]   1059 RLE US reveals occluded femoral popliteal bypass. Initiating heparin gtt and Consulting [facility] [CE]   1232 CTA reveals occluded right femoral graft with scattered flow through collaterals. Spoke with Eunice with [facility] who accepts admission  [CE]      ED Course User Index  [CE] Zara Haley MD                 Medical Decision Making  61 yo M with a significant cardiac and vascular history on anticoagulation presents with right lower extremity pain since 0400 today. No relieve with Morphine 30mg. Initially hypertensive 190/98 prior to taking home antihypertensives in triage. At the beside BP improved to 164/82. Physical exam reveal right 1+ DP pulse and left nonpalpable    Ddx includes Claudication, Limb ischemia, Compartment syndrome,  DVT    Obtaining labs and imaging to further  evaluate. Pain management     Amount and/or Complexity of Data Reviewed  External Data Reviewed: labs and notes.     Details: Reviewed labs 1/15/2021 kidney function intact.  Had a left peripheral angio with intervention 8/4/2021  Labs: ordered. Decision-making details documented in ED Course.  Radiology: ordered. Decision-making details documented in ED Course.  ECG/medicine tests: ordered. Decision-making details documented in ED Course.  Discussion of management or test interpretation with external provider(s): Consulted and discussed case with [facility], agreed to admit.    Risk  Decision regarding hospitalization.        Clinical Impression:   Final diagnoses:  [M79.606] Leg pain  [T82.898S] Femoral-popliteal bypass graft occlusion, right, sequela (Primary)  [N17.9] TRICIA (acute kidney injury)        ED Disposition Condition    Admit Stable                Zara C EkMD irma  Resident  04/08/23 7821

## 2023-04-08 NOTE — Clinical Note
The wire was inserted into the distal right superficial femoral artery. Placed to pull back sheath under fluro

## 2023-04-08 NOTE — Clinical Note
The left groin was prepped. The site was prepped with Betadine. The patient was draped. The patient was positioned supine.

## 2023-04-08 NOTE — Clinical Note
An angiography was performed of the proximal right popliteal artery, infrapopliteal artery, anterior tibial artery, tibio-peroneal trunk, peroneal artery and posterior tibial artery via power injection.  Angio performed through sheath; multiple views taken.

## 2023-04-08 NOTE — Clinical Note
An angiography was performed of the right lower extremity via power injection.  Angio taken through existing 6fr 45cm sheath.

## 2023-04-09 LAB
ANION GAP SERPL CALC-SCNC: 9 MEQ/L
APTT PPP: 57.6 SECONDS (ref 23.2–33.7)
APTT PPP: 65 SECONDS (ref 23.2–33.7)
APTT PPP: 66.2 SECONDS (ref 23.2–33.7)
APTT PPP: 71.4 SECONDS (ref 23.2–33.7)
BASOPHILS # BLD AUTO: 0.03 X10(3)/MCL (ref 0–0.2)
BASOPHILS NFR BLD AUTO: 0.4 %
BUN SERPL-MCNC: 19.5 MG/DL (ref 8.4–25.7)
CALCIUM SERPL-MCNC: 9.2 MG/DL (ref 8.8–10)
CHLORIDE SERPL-SCNC: 108 MMOL/L (ref 98–107)
CO2 SERPL-SCNC: 21 MMOL/L (ref 23–31)
CREAT SERPL-MCNC: 1.21 MG/DL (ref 0.73–1.18)
CREAT/UREA NIT SERPL: 16
EOSINOPHIL # BLD AUTO: 0.09 X10(3)/MCL (ref 0–0.9)
EOSINOPHIL NFR BLD AUTO: 1.2 %
ERYTHROCYTE [DISTWIDTH] IN BLOOD BY AUTOMATED COUNT: 12.2 % (ref 11.5–17)
GFR SERPLBLD CREATININE-BSD FMLA CKD-EPI: >60 MLS/MIN/1.73/M2
GLUCOSE SERPL-MCNC: 118 MG/DL (ref 82–115)
HCT VFR BLD AUTO: 36.7 % (ref 42–52)
HGB BLD-MCNC: 12.9 G/DL (ref 14–18)
IMM GRANULOCYTES # BLD AUTO: 0.02 X10(3)/MCL (ref 0–0.04)
IMM GRANULOCYTES NFR BLD AUTO: 0.3 %
LYMPHOCYTES # BLD AUTO: 1.59 X10(3)/MCL (ref 0.6–4.6)
LYMPHOCYTES NFR BLD AUTO: 22 %
MCH RBC QN AUTO: 32.3 PG (ref 27–31)
MCHC RBC AUTO-ENTMCNC: 35.1 G/DL (ref 33–36)
MCV RBC AUTO: 91.8 FL (ref 80–94)
MONOCYTES # BLD AUTO: 0.81 X10(3)/MCL (ref 0.1–1.3)
MONOCYTES NFR BLD AUTO: 11.2 %
NEUTROPHILS # BLD AUTO: 4.68 X10(3)/MCL (ref 2.1–9.2)
NEUTROPHILS NFR BLD AUTO: 64.9 %
NRBC BLD AUTO-RTO: 0 %
PLATELET # BLD AUTO: 144 X10(3)/MCL (ref 130–400)
PMV BLD AUTO: 10.5 FL (ref 7.4–10.4)
POTASSIUM SERPL-SCNC: 4 MMOL/L (ref 3.5–5.1)
RBC # BLD AUTO: 4 X10(6)/MCL (ref 4.7–6.1)
SODIUM SERPL-SCNC: 138 MMOL/L (ref 136–145)
WBC # SPEC AUTO: 7.2 X10(3)/MCL (ref 4.5–11.5)

## 2023-04-09 PROCEDURE — 25000003 PHARM REV CODE 250: Performed by: NURSE PRACTITIONER

## 2023-04-09 PROCEDURE — 63600175 PHARM REV CODE 636 W HCPCS: Performed by: STUDENT IN AN ORGANIZED HEALTH CARE EDUCATION/TRAINING PROGRAM

## 2023-04-09 PROCEDURE — 25000003 PHARM REV CODE 250: Performed by: STUDENT IN AN ORGANIZED HEALTH CARE EDUCATION/TRAINING PROGRAM

## 2023-04-09 PROCEDURE — 63600175 PHARM REV CODE 636 W HCPCS: Performed by: NURSE PRACTITIONER

## 2023-04-09 PROCEDURE — 80048 BASIC METABOLIC PNL TOTAL CA: CPT | Performed by: STUDENT IN AN ORGANIZED HEALTH CARE EDUCATION/TRAINING PROGRAM

## 2023-04-09 PROCEDURE — 85730 THROMBOPLASTIN TIME PARTIAL: CPT | Performed by: INTERNAL MEDICINE

## 2023-04-09 PROCEDURE — 25000003 PHARM REV CODE 250: Performed by: EMERGENCY MEDICINE

## 2023-04-09 PROCEDURE — 85025 COMPLETE CBC W/AUTO DIFF WBC: CPT | Performed by: STUDENT IN AN ORGANIZED HEALTH CARE EDUCATION/TRAINING PROGRAM

## 2023-04-09 PROCEDURE — 21400001 HC TELEMETRY ROOM

## 2023-04-09 RX ORDER — EZETIMIBE 10 MG/1
10 TABLET ORAL DAILY
Status: DISCONTINUED | OUTPATIENT
Start: 2023-04-09 | End: 2023-04-14 | Stop reason: HOSPADM

## 2023-04-09 RX ORDER — FOLIC ACID 1 MG/1
1000 TABLET ORAL DAILY
Status: DISCONTINUED | OUTPATIENT
Start: 2023-04-09 | End: 2023-04-14 | Stop reason: HOSPADM

## 2023-04-09 RX ORDER — DIPHENHYDRAMINE HCL 25 MG
25 CAPSULE ORAL EVERY 6 HOURS
Status: COMPLETED | OUTPATIENT
Start: 2023-04-09 | End: 2023-04-10

## 2023-04-09 RX ORDER — AMITRIPTYLINE HYDROCHLORIDE 25 MG/1
25 TABLET, FILM COATED ORAL 2 TIMES DAILY PRN
Status: DISCONTINUED | OUTPATIENT
Start: 2023-04-09 | End: 2023-04-14 | Stop reason: HOSPADM

## 2023-04-09 RX ORDER — BUSPIRONE HYDROCHLORIDE 5 MG/1
10 TABLET ORAL DAILY PRN
Status: DISCONTINUED | OUTPATIENT
Start: 2023-04-09 | End: 2023-04-14 | Stop reason: HOSPADM

## 2023-04-09 RX ORDER — ZOLPIDEM TARTRATE 5 MG/1
5 TABLET ORAL NIGHTLY PRN
Status: DISCONTINUED | OUTPATIENT
Start: 2023-04-09 | End: 2023-04-14 | Stop reason: HOSPADM

## 2023-04-09 RX ORDER — TAMSULOSIN HYDROCHLORIDE 0.4 MG/1
1 CAPSULE ORAL DAILY
Status: DISCONTINUED | OUTPATIENT
Start: 2023-04-09 | End: 2023-04-14 | Stop reason: HOSPADM

## 2023-04-09 RX ORDER — NITROGLYCERIN 0.4 MG/1
0.4 TABLET SUBLINGUAL EVERY 5 MIN PRN
Status: DISCONTINUED | OUTPATIENT
Start: 2023-04-09 | End: 2023-04-14 | Stop reason: HOSPADM

## 2023-04-09 RX ORDER — FAMOTIDINE 20 MG/1
20 TABLET, FILM COATED ORAL EVERY 6 HOURS
Status: COMPLETED | OUTPATIENT
Start: 2023-04-09 | End: 2023-04-10

## 2023-04-09 RX ORDER — PREDNISONE 50 MG/1
50 TABLET ORAL EVERY 6 HOURS
Status: COMPLETED | OUTPATIENT
Start: 2023-04-09 | End: 2023-04-10

## 2023-04-09 RX ORDER — METHOCARBAMOL 750 MG/1
750 TABLET, FILM COATED ORAL 3 TIMES DAILY
Status: DISCONTINUED | OUTPATIENT
Start: 2023-04-09 | End: 2023-04-14 | Stop reason: HOSPADM

## 2023-04-09 RX ORDER — ASPIRIN 81 MG/1
81 TABLET ORAL DAILY
Status: DISCONTINUED | OUTPATIENT
Start: 2023-04-09 | End: 2023-04-13

## 2023-04-09 RX ORDER — CLOPIDOGREL BISULFATE 75 MG/1
75 TABLET ORAL DAILY
Status: DISCONTINUED | OUTPATIENT
Start: 2023-04-09 | End: 2023-04-14 | Stop reason: HOSPADM

## 2023-04-09 RX ORDER — CILOSTAZOL 50 MG/1
50 TABLET ORAL 2 TIMES DAILY
Status: DISCONTINUED | OUTPATIENT
Start: 2023-04-09 | End: 2023-04-13

## 2023-04-09 RX ORDER — GABAPENTIN 300 MG/1
300 CAPSULE ORAL 3 TIMES DAILY
Status: DISCONTINUED | OUTPATIENT
Start: 2023-04-09 | End: 2023-04-14 | Stop reason: HOSPADM

## 2023-04-09 RX ORDER — ATORVASTATIN CALCIUM 40 MG/1
80 TABLET, FILM COATED ORAL NIGHTLY
Status: DISCONTINUED | OUTPATIENT
Start: 2023-04-09 | End: 2023-04-14 | Stop reason: HOSPADM

## 2023-04-09 RX ADMIN — CILOSTAZOL 50 MG: 50 TABLET ORAL at 09:04

## 2023-04-09 RX ADMIN — HYDRALAZINE HYDROCHLORIDE 100 MG: 50 TABLET, FILM COATED ORAL at 02:04

## 2023-04-09 RX ADMIN — MORPHINE SULFATE 30 MG: 15 TABLET ORAL at 07:04

## 2023-04-09 RX ADMIN — METHOCARBAMOL TABLETS 750 MG: 750 TABLET, COATED ORAL at 09:04

## 2023-04-09 RX ADMIN — PREDNISONE 50 MG: 50 TABLET ORAL at 06:04

## 2023-04-09 RX ADMIN — CARVEDILOL 25 MG: 12.5 TABLET, FILM COATED ORAL at 06:04

## 2023-04-09 RX ADMIN — ATORVASTATIN CALCIUM 80 MG: 40 TABLET, FILM COATED ORAL at 09:04

## 2023-04-09 RX ADMIN — ASPIRIN 81 MG: 81 TABLET, COATED ORAL at 09:04

## 2023-04-09 RX ADMIN — FAMOTIDINE 20 MG: 20 TABLET, FILM COATED ORAL at 06:04

## 2023-04-09 RX ADMIN — AMLODIPINE BESYLATE 10 MG: 5 TABLET ORAL at 09:04

## 2023-04-09 RX ADMIN — HYDRALAZINE HYDROCHLORIDE 100 MG: 50 TABLET, FILM COATED ORAL at 09:04

## 2023-04-09 RX ADMIN — GABAPENTIN 300 MG: 300 CAPSULE ORAL at 09:04

## 2023-04-09 RX ADMIN — PREDNISONE 50 MG: 50 TABLET ORAL at 11:04

## 2023-04-09 RX ADMIN — CARVEDILOL 25 MG: 12.5 TABLET, FILM COATED ORAL at 09:04

## 2023-04-09 RX ADMIN — HEPARIN SODIUM 15 UNITS/KG/HR: 10000 INJECTION, SOLUTION INTRAVENOUS at 08:04

## 2023-04-09 RX ADMIN — GABAPENTIN 300 MG: 300 CAPSULE ORAL at 02:04

## 2023-04-09 RX ADMIN — TAMSULOSIN HYDROCHLORIDE 0.4 MG: 0.4 CAPSULE ORAL at 09:04

## 2023-04-09 RX ADMIN — DIPHENHYDRAMINE HYDROCHLORIDE 25 MG: 25 CAPSULE ORAL at 11:04

## 2023-04-09 RX ADMIN — SACUBITRIL AND VALSARTAN 1 TABLET: 49; 51 TABLET, FILM COATED ORAL at 09:04

## 2023-04-09 RX ADMIN — CLOPIDOGREL BISULFATE 75 MG: 75 TABLET ORAL at 09:04

## 2023-04-09 RX ADMIN — METHOCARBAMOL TABLETS 750 MG: 750 TABLET, COATED ORAL at 02:04

## 2023-04-09 RX ADMIN — POLYETHYLENE GLYCOL 3350 17 G: 17 POWDER, FOR SOLUTION ORAL at 09:04

## 2023-04-09 RX ADMIN — FAMOTIDINE 20 MG: 20 TABLET, FILM COATED ORAL at 11:04

## 2023-04-09 RX ADMIN — EZETIMIBE 10 MG: 10 TABLET ORAL at 09:04

## 2023-04-09 RX ADMIN — FOLIC ACID 1000 MCG: 1 TABLET ORAL at 09:04

## 2023-04-09 RX ADMIN — DIPHENHYDRAMINE HYDROCHLORIDE 25 MG: 25 CAPSULE ORAL at 06:04

## 2023-04-09 RX ADMIN — MORPHINE SULFATE 30 MG: 15 TABLET ORAL at 09:04

## 2023-04-09 NOTE — H&P
[facility] - Emergency Dept  Cardiology  History and Physical     Patient Name: Yamil Escobedo  MRN: [id number]  Admission Date: 4/8/2023  Code Status: Full Code   Attending Provider: Suman Perales MD   Primary Care Physician: Jamari Gonzales MD  Principal Problem:<principal problem not specified>    Patient information was obtained from patient, past medical records, and ER records.     Subjective:     Chief Complaint:  RLE pain     HPI:   This is a 62-year-old male, who is known to Dr. Martinez and Dr. Lozano, with history of CAD/CABG x4, PAD/R fem-pop bypass and multiple interventions, PAF since ablation, HTN, ICMO, HLD.  He presented to [facility] on 04/08/2023 with complaints of right leg pain that started around 4:00 a.m. the morning of arrival.  He said the pain started initially upon ambulation and progressed to 8/10.  He said the pain was constant and radiated from the thigh to the calf.  He complained of associated numbness to the right lower extremity.  He said he took his home oral morphine without relief.  Arterial NIVA revealed moderately severe arterial flow reduction consistent with an occluded femoral artery bypass and dampened collateral flow identified in the distal popliteal artery through the posterior tibial and dorsalis pedis arteries.  Venous was negative for DVT.  CTA revealed occluded right femoral vein grafts with scattered flow in the deep femoral artery, popliteal artery, posterior tibial, and peroneal arteries.  [facility] has admitted the patient for severe PAD. His pain in the leg is much better now, basically gone, but he did recently get some pain meds.        PMH: CAD/CABG x4, PAD/R fem-pop bypass and multiple interventions, PAF since ablation, HTN, ICMO, HLD  PSH:  Right fem-pop bypass, peripheral angiogram/multiple interventions, CABG x4 (LIMA to 2nd diagonal with sequential graft to obtuse marginal, sequential SVG from distal circumflex to right PDA), appendectomy  Social History:   Former smoker, denies EtOH and illicit drug use  Family History:  Father-lung cancer; mother-HTN    Previous Cardiac Diagnostics:   RLE Arterial NIVA 4.8.23  The right lower extremity demonstrated moderately severe arterial flow reduction consistent with an occluded femoral artery bypass.  Dampened collateral flow identified in the distal popliteal artery through posterior tibial and dorsalis pedis arteries.    RLE Venous NIVA 4.8.23  There was no evidence of deep or superficial vein thrombosis in the right lower extremity    Peripheral Angiogram 2.23.22  Old occluded fem-pop graft  Occluded SFA  Fem-tib graft with 80% distal stenosis status post PTA with 6 mm balloon reduced to 20% stenosis        Review of patient's allergies indicates:   Allergen Reactions    Ciprofloxacin     Iodinated contrast media      N & V.; CONFUSION    Sulfa (sulfonamide antibiotics)        No current facility-administered medications on file prior to encounter.     Current Outpatient Medications on File Prior to Encounter   Medication Sig    amLODIPine (NORVASC) 10 MG tablet Take 10 mg by mouth once daily.    carvediloL (COREG) 25 MG tablet Take 25 mg by mouth 2 (two) times daily with meals.    cilostazoL (PLETAL) 100 MG Tab Take 50 mg by mouth 2 (two) times daily.    clopidogreL (PLAVIX) 75 mg tablet Take 75 mg by mouth once daily.    docusate sodium (COLACE) 100 MG capsule Take 100 mg by mouth once daily. As directed    ezetimibe (ZETIA) 10 mg tablet Take 10 mg by mouth once daily.    folic acid (FOLVITE) 800 MCG Tab Take 800 mcg by mouth once daily.    hydrALAZINE (APRESOLINE) 100 MG tablet Take 100 mg by mouth 3 (three) times daily.    morphine 30 mg TRer Take 1 capsule by mouth 2 (two) times a day.    rivaroxaban (XARELTO) 20 mg Tab Take 20 mg by mouth once daily.     rosuvastatin (CRESTOR) 40 MG Tab Take 40 mg by mouth every evening.    sacubitriL-valsartan (ENTRESTO) 49-51 mg per tablet Take 1 tablet by mouth 2 (two) times daily.     amitriptyline (ELAVIL) 25 MG tablet Take 25 mg by mouth 2 (two) times daily as needed.     aspirin (ECOTRIN) 81 MG EC tablet Take 81 mg by mouth once daily.    busPIRone (BUSPAR) 10 MG tablet Take 10 mg by mouth daily as needed.    diphenoxylate-atropine 2.5-0.025 mg (LOMOTIL) 2.5-0.025 mg per tablet Take 2 tablets by mouth 4 (four) times daily as needed.    esomeprazole (NEXIUM) 40 MG capsule Take 40 mg by mouth once daily.    furosemide (LASIX) 40 MG tablet Take 40 mg by mouth daily as needed.    gabapentin (NEURONTIN) 300 MG capsule Take 300 mg by mouth 3 (three) times daily.     methocarbamoL (ROBAXIN) 750 MG Tab Take 1 tablet by mouth 3 (three) times daily.    nitroGLYCERIN (NITROSTAT) 0.4 MG SL tablet Place 0.4 mg under the tongue every 5 (five) minutes as needed for Chest pain.    oxyCODONE (ROXICODONE) 15 MG Tab Take 15 mg by mouth 2 (two) times a day.     tamsulosin (FLOMAX) 0.4 mg Cap Take 1 capsule by mouth once daily.    zolpidem (AMBIEN CR) 6.25 MG CR tablet Take 6.25 mg by mouth nightly as needed.         Review of Systems   Constitutional: Negative.   HENT: Negative.     Eyes: Negative.    Cardiovascular:  Positive for claudication.        RLE pain   Respiratory: Negative.     Endocrine: Negative.    Hematologic/Lymphatic: Negative.    Skin: Negative.    Musculoskeletal:  Positive for muscle cramps.   Gastrointestinal: Negative.    Genitourinary: Negative.    Neurological: Negative.    Psychiatric/Behavioral: Negative.     Allergic/Immunologic: Negative.    Objective:     Vital Signs (Most Recent):  Temp: 97.9 °F (36.6 °C) (04/08/23 0802)  Pulse: 64 (04/09/23 0600)  Resp: 14 (04/09/23 0600)  BP: 130/73 (04/09/23 0600)  SpO2: 95 % (04/09/23 0600) Vital Signs (24h Range):  Temp:  [97.9 °F (36.6 °C)] 97.9 °F (36.6 °C)  Pulse:  [63-82] 64  Resp:  [10-32] 14  SpO2:  [92 %-99 %] 95 %  BP: (129-190)/() 130/73     Weight: 73.9 kg (163 lb)  Body mass index is 27.12 kg/m².    SpO2: 95 %       No intake  or output data in the 24 hours ending 04/09/23 0658    Lines/Drains/Airways       Peripheral Intravenous Line  Duration                  Peripheral IV - Single Lumen 04/08/23 0912 20 G Posterior;Right Hand <1 day         Peripheral IV - Single Lumen 04/08/23 1102 20 G Right Antecubital <1 day                    Physical Exam  Constitutional:       Appearance: Normal appearance. He is normal weight.   HENT:      Head: Normocephalic.      Nose: Nose normal.      Mouth/Throat:      Mouth: Mucous membranes are moist.   Eyes:      Extraocular Movements: Extraocular movements intact.      Conjunctiva/sclera: Conjunctivae normal.   Cardiovascular:      Rate and Rhythm: Normal rate and regular rhythm.      Heart sounds: Normal heart sounds.   Pulmonary:      Effort: Pulmonary effort is normal.      Breath sounds: Normal breath sounds.   Abdominal:      General: Abdomen is flat. There is no distension.      Palpations: Abdomen is soft.      Tenderness: There is no abdominal tenderness.   Musculoskeletal:         General: Normal range of motion.      Cervical back: Normal range of motion and neck supple.      Right lower leg: No edema.      Left lower leg: No edema.   Skin:     General: Skin is warm and dry.      Comments: RLE warm, non-palpable pulses in foot   Neurological:      General: No focal deficit present.      Mental Status: He is alert and oriented to person, place, and time. Mental status is at baseline.   Psychiatric:         Mood and Affect: Mood normal.         Behavior: Behavior normal.         Thought Content: Thought content normal.         Judgment: Judgment normal.       Significant Labs: CMP   Recent Labs   Lab 04/08/23  1057 04/09/23  0245    138   K 4.8 4.0   CO2 22* 21*   BUN 19.6 19.5   CREATININE 1.21* 1.21*   CALCIUM 9.5 9.2   ALBUMIN 4.2  --    BILITOT 0.5  --    ALKPHOS 46  --    AST 31  --    ALT 26  --    , CBC   Recent Labs   Lab 04/08/23  1057 04/09/23  0245   WBC 4.9 7.2   HGB 12.5*  12.9*   HCT 36.1* 36.7*    144   , and Troponin No results for input(s): TROPONINI in the last 48 hours.    Significant Imaging:   Imaging Results              CTA Lower Extremity Right (Final result)  Result time 04/08/23 12:03:15      Final result by Arina Lagos MD (04/08/23 12:03:15)                   Impression:      Occluded right femoral vein grafts with scattered flow in the deep femoral artery, popliteal artery, posterior tibial and peroneal arteries.      Electronically signed by: Arina Lagos  Date:    04/08/2023  Time:    12:03               Narrative:    EXAMINATION:  CTA LOWER EXTREMITY RIGHT    CLINICAL HISTORY:  right leg pain, history of PAD, fem pop x 4;    TECHNIQUE:  Axial images were obtained through the right lower extremity with contrast in the arterial phase.    Coronal, sagittal, MIP and 3-D reconstructions obtained from the axial data set.    Radiation Dose:    Total DLP: 1302 mGy*cm    COMPARISON:  None available    FINDINGS:  There are extensive calcifications of the distal abdominal aorta and iliac branches.  There is moderate multifocal narrowing of the bilateral internal and external iliac arteries.  Bilateral external iliac artery stents are in place.    There is no flow within the right femoral vein grafts.  Flow is seen within the deep femoral artery with moderate multifocal narrowing.  There is some reconstitution of flow within the popliteal artery which, with a stent at the tibioperoneal trunk.  Flow is seen within the posterior tibial and peroneal arteries to the level of the ankle.  There are extensive calcifications along the anterior temporal artery with minimal flow identified.    The left femoral artery graft appears to be patent.  The popliteal artery and tibioperoneal trunk are patent with calcifications.  The anterior tibial artery and peroneal artery appear patent.  The posterior tibial artery is not well opacified.                                     EKG:      Assessment and Plan:     See below MDM formulated by me (Suman Perales MD):    IMPRESSION:  Severe BLE PAD/ BLE Fem-Pop and RLE Fem-Tib bypass as well as multiple interventions  -Complaining of RLE pain beginning yesterday morning, resolved as of now on Heparin  -He has dampened collateral flow via the profunda  CAD/CABG x 4 (LIMA to 2nd diagonal with sequential graft to obtuse marginal, sequential SVG from distal circumflex to right PDA)  PAF/ablation  -[id number]  -On Xarelto, last dose 4.7.23  HTN  HLD  ICMO/EF 45%  Iodine Allergy    PLAN:  Continue heparin gtt  Continue home medications except for Xarelto  Continue to hole Xarelto  Plan for Peripheral Angio in AM  Dye Prep  NPO p MN  Risk, Benefits and Alternatives Reviewed and Discussed with the PT and their Family and they wish to proceed with above Procedure.        MEGGAN Vences-BC and Suman Perales MD  Cardiology  [facility] - Emergency Dept  04/09/2023 6:58 AM

## 2023-04-10 LAB
ANION GAP SERPL CALC-SCNC: 12 MEQ/L
APTT PPP: 92.7 SECONDS (ref 23.2–33.7)
BASOPHILS # BLD AUTO: 0.01 X10(3)/MCL (ref 0–0.2)
BASOPHILS NFR BLD AUTO: 0.2 %
BUN SERPL-MCNC: 20.3 MG/DL (ref 8.4–25.7)
CALCIUM SERPL-MCNC: 9.3 MG/DL (ref 8.8–10)
CHLORIDE SERPL-SCNC: 108 MMOL/L (ref 98–107)
CO2 SERPL-SCNC: 17 MMOL/L (ref 23–31)
CREAT SERPL-MCNC: 1.33 MG/DL (ref 0.73–1.18)
CREAT/UREA NIT SERPL: 15
EOSINOPHIL # BLD AUTO: 0.01 X10(3)/MCL (ref 0–0.9)
EOSINOPHIL NFR BLD AUTO: 0.2 %
ERYTHROCYTE [DISTWIDTH] IN BLOOD BY AUTOMATED COUNT: 12.1 % (ref 11.5–17)
GFR SERPLBLD CREATININE-BSD FMLA CKD-EPI: 60 MLS/MIN/1.73/M2
GLUCOSE SERPL-MCNC: 137 MG/DL (ref 82–115)
HCT VFR BLD AUTO: 35.8 % (ref 42–52)
HGB BLD-MCNC: 12.1 G/DL (ref 14–18)
IMM GRANULOCYTES # BLD AUTO: 0.02 X10(3)/MCL (ref 0–0.04)
IMM GRANULOCYTES NFR BLD AUTO: 0.4 %
LYMPHOCYTES # BLD AUTO: 0.64 X10(3)/MCL (ref 0.6–4.6)
LYMPHOCYTES NFR BLD AUTO: 14.2 %
MCH RBC QN AUTO: 31.2 PG (ref 27–31)
MCHC RBC AUTO-ENTMCNC: 33.8 G/DL (ref 33–36)
MCV RBC AUTO: 92.3 FL (ref 80–94)
MONOCYTES # BLD AUTO: 0.17 X10(3)/MCL (ref 0.1–1.3)
MONOCYTES NFR BLD AUTO: 3.8 %
NEUTROPHILS # BLD AUTO: 3.66 X10(3)/MCL (ref 2.1–9.2)
NEUTROPHILS NFR BLD AUTO: 81.2 %
NRBC BLD AUTO-RTO: 0 %
PLATELET # BLD AUTO: 155 X10(3)/MCL (ref 130–400)
PMV BLD AUTO: 11.2 FL (ref 7.4–10.4)
POCT GLUCOSE: 221 MG/DL (ref 70–110)
POTASSIUM SERPL-SCNC: 5.4 MMOL/L (ref 3.5–5.1)
RBC # BLD AUTO: 3.88 X10(6)/MCL (ref 4.7–6.1)
SODIUM SERPL-SCNC: 137 MMOL/L (ref 136–145)
WBC # SPEC AUTO: 4.5 X10(3)/MCL (ref 4.5–11.5)

## 2023-04-10 PROCEDURE — 99153 MOD SED SAME PHYS/QHP EA: CPT | Performed by: STUDENT IN AN ORGANIZED HEALTH CARE EDUCATION/TRAINING PROGRAM

## 2023-04-10 PROCEDURE — 25000003 PHARM REV CODE 250: Performed by: STUDENT IN AN ORGANIZED HEALTH CARE EDUCATION/TRAINING PROGRAM

## 2023-04-10 PROCEDURE — 63600175 PHARM REV CODE 636 W HCPCS: Performed by: NURSE PRACTITIONER

## 2023-04-10 PROCEDURE — 37211 THROMBOLYTIC ART THERAPY: CPT | Performed by: STUDENT IN AN ORGANIZED HEALTH CARE EDUCATION/TRAINING PROGRAM

## 2023-04-10 PROCEDURE — 63600175 PHARM REV CODE 636 W HCPCS: Performed by: STUDENT IN AN ORGANIZED HEALTH CARE EDUCATION/TRAINING PROGRAM

## 2023-04-10 PROCEDURE — 85025 COMPLETE CBC W/AUTO DIFF WBC: CPT | Performed by: STUDENT IN AN ORGANIZED HEALTH CARE EDUCATION/TRAINING PROGRAM

## 2023-04-10 PROCEDURE — C1757 CATH, THROMBECTOMY/EMBOLECT: HCPCS | Performed by: STUDENT IN AN ORGANIZED HEALTH CARE EDUCATION/TRAINING PROGRAM

## 2023-04-10 PROCEDURE — 85730 THROMBOPLASTIN TIME PARTIAL: CPT | Performed by: INTERNAL MEDICINE

## 2023-04-10 PROCEDURE — 20000000 HC ICU ROOM

## 2023-04-10 PROCEDURE — C1769 GUIDE WIRE: HCPCS | Performed by: STUDENT IN AN ORGANIZED HEALTH CARE EDUCATION/TRAINING PROGRAM

## 2023-04-10 PROCEDURE — 36247 INS CATH ABD/L-EXT ART 3RD: CPT | Performed by: STUDENT IN AN ORGANIZED HEALTH CARE EDUCATION/TRAINING PROGRAM

## 2023-04-10 PROCEDURE — 25000003 PHARM REV CODE 250: Performed by: NURSE PRACTITIONER

## 2023-04-10 PROCEDURE — 75710 ARTERY X-RAYS ARM/LEG: CPT | Mod: XU | Performed by: STUDENT IN AN ORGANIZED HEALTH CARE EDUCATION/TRAINING PROGRAM

## 2023-04-10 PROCEDURE — C1894 INTRO/SHEATH, NON-LASER: HCPCS | Performed by: STUDENT IN AN ORGANIZED HEALTH CARE EDUCATION/TRAINING PROGRAM

## 2023-04-10 PROCEDURE — 25500020 PHARM REV CODE 255: Performed by: STUDENT IN AN ORGANIZED HEALTH CARE EDUCATION/TRAINING PROGRAM

## 2023-04-10 PROCEDURE — 99152 MOD SED SAME PHYS/QHP 5/>YRS: CPT | Performed by: STUDENT IN AN ORGANIZED HEALTH CARE EDUCATION/TRAINING PROGRAM

## 2023-04-10 PROCEDURE — C1887 CATHETER, GUIDING: HCPCS | Performed by: STUDENT IN AN ORGANIZED HEALTH CARE EDUCATION/TRAINING PROGRAM

## 2023-04-10 PROCEDURE — 25000003 PHARM REV CODE 250: Performed by: EMERGENCY MEDICINE

## 2023-04-10 PROCEDURE — 80048 BASIC METABOLIC PNL TOTAL CA: CPT | Performed by: NURSE PRACTITIONER

## 2023-04-10 RX ORDER — LIDOCAINE HYDROCHLORIDE 10 MG/ML
INJECTION INFILTRATION; PERINEURAL
Status: DISCONTINUED | OUTPATIENT
Start: 2023-04-10 | End: 2023-04-10 | Stop reason: HOSPADM

## 2023-04-10 RX ORDER — FENTANYL CITRATE 50 UG/ML
INJECTION, SOLUTION INTRAMUSCULAR; INTRAVENOUS
Status: DISCONTINUED | OUTPATIENT
Start: 2023-04-10 | End: 2023-04-10 | Stop reason: HOSPADM

## 2023-04-10 RX ORDER — FAMOTIDINE 20 MG/1
20 TABLET, FILM COATED ORAL EVERY 6 HOURS
Status: COMPLETED | OUTPATIENT
Start: 2023-04-10 | End: 2023-04-11

## 2023-04-10 RX ORDER — DIPHENHYDRAMINE HCL 25 MG
25 CAPSULE ORAL EVERY 6 HOURS
Status: COMPLETED | OUTPATIENT
Start: 2023-04-10 | End: 2023-04-11

## 2023-04-10 RX ORDER — PREDNISONE 50 MG/1
50 TABLET ORAL EVERY 6 HOURS
Status: COMPLETED | OUTPATIENT
Start: 2023-04-10 | End: 2023-04-11

## 2023-04-10 RX ORDER — SODIUM CHLORIDE 9 MG/ML
INJECTION, SOLUTION INTRAVENOUS CONTINUOUS
Status: DISCONTINUED | OUTPATIENT
Start: 2023-04-10 | End: 2023-04-12

## 2023-04-10 RX ORDER — MIDAZOLAM HYDROCHLORIDE 1 MG/ML
INJECTION INTRAMUSCULAR; INTRAVENOUS
Status: DISCONTINUED | OUTPATIENT
Start: 2023-04-10 | End: 2023-04-10 | Stop reason: HOSPADM

## 2023-04-10 RX ORDER — HEPARIN SODIUM 10000 [USP'U]/100ML
400 INJECTION, SOLUTION INTRAVENOUS CONTINUOUS
Status: DISCONTINUED | OUTPATIENT
Start: 2023-04-10 | End: 2023-04-11

## 2023-04-10 RX ORDER — HEPARIN SODIUM 1000 [USP'U]/ML
INJECTION, SOLUTION INTRAVENOUS; SUBCUTANEOUS
Status: DISCONTINUED | OUTPATIENT
Start: 2023-04-10 | End: 2023-04-10 | Stop reason: HOSPADM

## 2023-04-10 RX ADMIN — HYDRALAZINE HYDROCHLORIDE 100 MG: 50 TABLET, FILM COATED ORAL at 02:04

## 2023-04-10 RX ADMIN — DIPHENHYDRAMINE HYDROCHLORIDE 25 MG: 25 CAPSULE ORAL at 05:04

## 2023-04-10 RX ADMIN — CARVEDILOL 25 MG: 12.5 TABLET, FILM COATED ORAL at 05:04

## 2023-04-10 RX ADMIN — CILOSTAZOL 50 MG: 50 TABLET ORAL at 08:04

## 2023-04-10 RX ADMIN — ATORVASTATIN CALCIUM 80 MG: 40 TABLET, FILM COATED ORAL at 08:04

## 2023-04-10 RX ADMIN — OXYCODONE HYDROCHLORIDE 15 MG: 10 TABLET ORAL at 08:04

## 2023-04-10 RX ADMIN — PREDNISONE 50 MG: 50 TABLET ORAL at 05:04

## 2023-04-10 RX ADMIN — GABAPENTIN 300 MG: 300 CAPSULE ORAL at 02:04

## 2023-04-10 RX ADMIN — SODIUM CHLORIDE: 9 INJECTION, SOLUTION INTRAVENOUS at 03:04

## 2023-04-10 RX ADMIN — HEPARIN SODIUM 400 UNITS/HR: 10000 INJECTION, SOLUTION INTRAVENOUS at 03:04

## 2023-04-10 RX ADMIN — FAMOTIDINE 20 MG: 20 TABLET, FILM COATED ORAL at 06:04

## 2023-04-10 RX ADMIN — MORPHINE SULFATE 30 MG: 15 TABLET ORAL at 05:04

## 2023-04-10 RX ADMIN — HYDRALAZINE HYDROCHLORIDE 100 MG: 50 TABLET, FILM COATED ORAL at 08:04

## 2023-04-10 RX ADMIN — DIPHENHYDRAMINE HYDROCHLORIDE 25 MG: 25 CAPSULE ORAL at 06:04

## 2023-04-10 RX ADMIN — SODIUM POLYSTYRENE SULFONATE 15 G: 15 SUSPENSION ORAL; RECTAL at 03:04

## 2023-04-10 RX ADMIN — HEPARIN SODIUM 17 UNITS/KG/HR: 10000 INJECTION, SOLUTION INTRAVENOUS at 06:04

## 2023-04-10 RX ADMIN — FAMOTIDINE 20 MG: 20 TABLET, FILM COATED ORAL at 05:04

## 2023-04-10 RX ADMIN — PREDNISONE 50 MG: 50 TABLET ORAL at 06:04

## 2023-04-10 RX ADMIN — SACUBITRIL AND VALSARTAN 1 TABLET: 49; 51 TABLET, FILM COATED ORAL at 08:04

## 2023-04-10 RX ADMIN — METHOCARBAMOL TABLETS 750 MG: 750 TABLET, COATED ORAL at 08:04

## 2023-04-10 RX ADMIN — ALTEPLASE 1 MG/HR: KIT at 03:04

## 2023-04-10 RX ADMIN — Medication 6 MG: at 08:04

## 2023-04-10 RX ADMIN — GABAPENTIN 300 MG: 300 CAPSULE ORAL at 08:04

## 2023-04-10 RX ADMIN — CARVEDILOL 25 MG: 12.5 TABLET, FILM COATED ORAL at 08:04

## 2023-04-10 RX ADMIN — METHOCARBAMOL TABLETS 750 MG: 750 TABLET, COATED ORAL at 02:04

## 2023-04-10 RX ADMIN — AMLODIPINE BESYLATE 10 MG: 5 TABLET ORAL at 08:04

## 2023-04-10 RX ADMIN — CLOPIDOGREL BISULFATE 75 MG: 75 TABLET ORAL at 08:04

## 2023-04-10 NOTE — NURSING
Nurses Note -- 4 Eyes      4/10/2023   4:37 PM      Skin assessed during: Transfer      [x] No Pressure Injuries Present    [x]Prevention Measures Documented      [] Yes- Altered Skin Integrity Present or Discovered   [] LDA Added if Not in Epic (Describe Wound)   [] New Altered Skin Integrity was Present on Admit and Documented in LDA   [] Wound Image Taken    Wound Care Consulted? No    Attending Nurse:  Claribel Sarah RN     Second RN/Staff Member:  Rosario Tejada RN

## 2023-04-10 NOTE — NURSING
Nurses Note -- 4 Eyes      4/10/2023   2:43 PM      Skin assessed during: Admit      [x] No Pressure Injuries Present    [x]Prevention Measures Documented      [] Yes- Altered Skin Integrity Present or Discovered   [] LDA Added if Not in Epic (Describe Wound)   [] New Altered Skin Integrity was Present on Admit and Documented in LDA   [] Wound Image Taken    Wound Care Consulted? No    Attending Nurse:  Rosario Tejada RN     Second RN/Staff Member:  Benito Brownlee RN

## 2023-04-10 NOTE — H&P
[facility] - [facility] ICU  Pulmonary Critical Care Note    Patient Name: Yamil Escobedo  MRN: [id number]  Admission Date: 4/8/2023  Hospital Length of Stay: 2 days  Code Status: Full Code  Attending Provider: Suman Perales MD  Primary Care Provider: Jamari Gonzales MD     Subjective:     HPI:   62-year-old male with a past medical history of CAD/CABG x4 in 2009, PAD status post right and left fem-pop bypass with multiple interventions, PAF status post ablation, hypertension, ICM, hyperlipidemia presents to [facility] on 04/08/2023 with complaints of severe right leg pain that started around 4:00 a.m. that morning.  Pain started initially upon ambulation and progressed to an 8/10 pain which radiated from the thigh to the calf.  Associated numbness and tingling to the right lower extremity.  Arterial NIVA revealed moderately severe arterial flow reduction consistent with an occluded femoral artery bypass and damp and collateral flow identified in the distal popliteal artery with the posterior tibial and dorsalis pedis arteries.  Follow-up CTA revealed occluded right femoral vein grafts with scattered flow in the deep femoral artery, popliteal artery, posterior tibial, peroneal arteries.  The patient was admitted and started on a heparin infusion.  Peripheral angiography was done thrombolytics given.  Ekos was initiated with plan to take patient to cath lab in the morning.  Patient currently denies no pain the right or left lower extremity.  He denies fever, chills, nausea, vomiting, diarrhea, back pain, chest pain, shortness a breath, weakness, rash and any other symptoms.    Hospital Course/Significant events:  Peripheral angiography 4/10/2023  RLE Ekos 4/10/2023    24 Hour Interval History:      Past Medical History:   Diagnosis Date    A-fib     Acid reflux     Acid reflux     Anemia     Anticoagulant long-term use     Anxiety     Cardiac arrest     Claudication     Coronary artery disease     Depression     DVT  (deep venous thrombosis)     Embolus     Encounter for blood transfusion     Foot drop, right     Hemorrhoids     HLD (hyperlipidemia)     Hypertension     Insomnia     MI (myocardial infarction)     Numbness     R GREAT TOE ; PARTIAL LEFT FOOT    PAD (peripheral artery disease)     PVD (peripheral vascular disease)     Rhabdomyolysis     TIA (transient ischemic attack)     UTI (urinary tract infection)        Past Surgical History:   Procedure Laterality Date    ABLATION OF ARRHYTHMOGENIC FOCUS FOR ATRIAL FIBRILLATION      X 3    APPENDECTOMY      CABG X 4      CARDIAC ANGIOGRAM WITH STENTS      CARDIAC ARREST      DURING FEMORAL BYPASSES; 3 TIMES DURING CABG    EP STUDY      FEMORAL BYPASS Bilateral     TIMES 3    LASER ATHERECTOMY LEFT FEM-POP BYPASS GRAFT      PERCUTANEOUS TRANSLUMINAL ANGIOPLASTY (PTA) OF PERIPHERAL VESSEL N/A 2021    Procedure: PTA, PERIPHERAL VESSEL;  Surgeon: Melvin Martinez MD;  Location: [facility] CATH;  Service: Cardiology;  Laterality: N/A;    PERCUTANEOUS TRANSLUMINAL ANGIOPLASTY (PTA) OF PERIPHERAL VESSEL Left 2021    Procedure: PTA, PERIPHERAL VESSEL of left SFA utilizing distal protection;  Surgeon: Melvin Martinez MD;  Location: [facility] CATH;  Service: Cardiology;  Laterality: Left;       Social History     Socioeconomic History    Marital status:    Tobacco Use    Smoking status: Former     Types: Cigarettes     Quit date:      Years since quittin.2   Substance and Sexual Activity    Alcohol use: Not Currently     Comment: QUIT A LONG TIME AGO    Drug use: Not Currently     Social Determinants of Health     Social Connections: Unknown    Marital Status:            Current Outpatient Medications   Medication Instructions    amitriptyline (ELAVIL) 25 mg, Oral, 2 times daily PRN    amLODIPine (NORVASC) 10 mg, Oral, Daily    aspirin (ECOTRIN) 81 mg, Oral, Daily    busPIRone (BUSPAR) 10 mg, Oral, Daily PRN    carvediloL (COREG) 25 mg, Oral, 2 times daily with  meals    cilostazoL (PLETAL) 50 mg, Oral, 2 times daily    clopidogreL (PLAVIX) 75 mg, Oral, Daily    diphenoxylate-atropine 2.5-0.025 mg (LOMOTIL) 2.5-0.025 mg per tablet 2 tablets, Oral, 4 times daily PRN    docusate sodium (COLACE) 100 mg, Oral, Daily, As directed    esomeprazole (NEXIUM) 40 mg, Oral, Daily    ezetimibe (ZETIA) 10 mg, Oral, Daily    folic acid (FOLVITE) 800 mcg, Oral, Daily    furosemide (LASIX) 40 mg, Oral, Daily PRN    gabapentin (NEURONTIN) 300 mg, Oral, 3 times daily    hydrALAZINE (APRESOLINE) 100 mg, Oral, 3 times daily    methocarbamoL (ROBAXIN) 750 MG Tab 1 tablet, Oral, 3 times daily    morphine 30 mg TRer 1 capsule, Oral, 2 times daily    nitroGLYCERIN (NITROSTAT) 0.4 mg, Sublingual, Every 5 min PRN    oxyCODONE (ROXICODONE) 15 mg, Oral, 2 times daily    rosuvastatin (CRESTOR) 40 mg, Oral, Nightly    sacubitriL-valsartan (ENTRESTO) 49-51 mg per tablet 1 tablet, Oral, 2 times daily    tamsulosin (FLOMAX) 0.4 mg Cap 1 capsule, Oral, Daily    XARELTO 20 mg, Oral, Daily    zolpidem (AMBIEN CR) 6.25 mg, Oral, Nightly PRN       Current Inpatient Medications   amLODIPine  10 mg Oral Daily    aspirin  81 mg Oral Daily    atorvastatin  80 mg Oral QHS    carvediloL  25 mg Oral BID WM    cilostazoL  50 mg Oral BID    clopidogreL  75 mg Oral Daily    diphenhydrAMINE  25 mg Oral Q6H    ezetimibe  10 mg Oral Daily    famotidine  20 mg Oral Q6H    folic acid  1,000 mcg Oral Daily    gabapentin  300 mg Oral TID    hydrALAZINE  100 mg Oral TID    methocarbamoL  750 mg Oral TID    oxyCODONE  15 mg Oral BID    polyethylene glycol  17 g Oral Daily    predniSONE  50 mg Oral Q6H    tamsulosin  1 capsule Oral Daily       Current Intravenous Infusions   sodium chloride 0.9% 35 mL/hr at 04/10/23 1501    alteplase 12.5 mg in 0.9% NaCl 250 mL (CAR/VAS use only) 1 mg/hr (04/10/23 1503)    heparin (porcine) in 5 % dex 400 Units/hr (04/10/23 1506)    lactated ringers 100 mL/hr at 04/08/23 1929         Review of  Systems   Constitutional:  Negative for chills and fever.   HENT:  Negative for congestion.    Eyes:  Negative for redness.   Respiratory:  Negative for shortness of breath.    Cardiovascular:  Negative for chest pain and claudication.   Gastrointestinal:  Negative for abdominal pain, diarrhea, nausea and vomiting.   Genitourinary:  Negative for dysuria, hematuria and urgency.   Musculoskeletal:  Negative for back pain and neck pain.   Neurological:  Negative for dizziness, tingling, focal weakness, weakness and headaches.   Endo/Heme/Allergies:  Does not bruise/bleed easily.        Objective:       Intake/Output Summary (Last 24 hours) at 4/10/2023 1508  Last data filed at 4/10/2023 0702  Gross per 24 hour   Intake --   Output 425 ml   Net -425 ml         Vital Signs (Most Recent):  Temp: 97.8 °F (36.6 °C) (04/10/23 0749)  Pulse: 84 (04/10/23 1445)  Resp: (!) 21 (04/10/23 1445)  BP: 117/75 (04/10/23 1445)  SpO2: 97 % (04/10/23 1445)  Body mass index is 26.96 kg/m².  Weight: 73.5 kg (162 lb) Vital Signs (24h Range):  Temp:  [97.6 °F (36.4 °C)-98.9 °F (37.2 °C)] 97.8 °F (36.6 °C)  Pulse:  [71-91] 84  Resp:  [15-26] 21  SpO2:  [95 %-99 %] 97 %  BP: (112-169)/(62-89) 117/75     Physical Exam  Vitals and nursing note reviewed.   Constitutional:       General: He is not in acute distress.     Appearance: Normal appearance. He is not ill-appearing.   HENT:      Head: Normocephalic and atraumatic.      Right Ear: Tympanic membrane normal.      Left Ear: Tympanic membrane normal.      Nose: Nose normal.      Mouth/Throat:      Mouth: Mucous membranes are moist.      Pharynx: Oropharynx is clear.   Eyes:      Extraocular Movements: Extraocular movements intact.      Pupils: Pupils are equal, round, and reactive to light.   Cardiovascular:      Rate and Rhythm: Normal rate and regular rhythm.      Pulses: Normal pulses.      Heart sounds: Normal heart sounds. No murmur heard.    No friction rub.      Comments: 1+ dorsalis  pedis pulses bilaterally.  Pulmonary:      Effort: Pulmonary effort is normal.      Breath sounds: Normal breath sounds.   Abdominal:      General: Abdomen is flat. Bowel sounds are normal. There is no distension.      Palpations: Abdomen is soft. There is no mass.      Tenderness: There is no abdominal tenderness. There is no guarding or rebound.      Hernia: No hernia is present.   Musculoskeletal:         General: No swelling, tenderness, deformity or signs of injury. Normal range of motion.      Cervical back: Normal range of motion and neck supple.      Right lower leg: No edema.      Left lower leg: No edema.      Comments: Bilateral lower extremities warm to touch.   Skin:     General: Skin is warm.      Findings: No bruising, erythema or rash.   Neurological:      General: No focal deficit present.      Mental Status: He is alert and oriented to person, place, and time. Mental status is at baseline.         Lines/Drains/Airways       Drain  Duration                  Sheath 04/10/23 1132 Left anterior;proximal <1 day              Peripheral Intravenous Line  Duration                  Peripheral IV - Single Lumen 04/08/23 0912 20 G Posterior;Right Hand 2 days         Peripheral IV - Single Lumen 04/09/23 1605 20 G Anterior;Right Forearm <1 day              Ultrasonic Catheter  Duration                  Ultrasonic Catheter -- days         Ultrasonic Catheter <1 day         Ultrasonic Catheter <1 day                    Significant Labs:    Lab Results   Component Value Date    WBC 4.5 04/10/2023    HGB 12.1 (L) 04/10/2023    HCT 35.8 (L) 04/10/2023    MCV 92.3 04/10/2023     04/10/2023         BMP  Lab Results   Component Value Date     04/10/2023    K 5.4 (H) 04/10/2023    CHLORIDE 108 (H) 04/10/2023    CO2 17 (L) 04/10/2023    BUN 20.3 04/10/2023    CREATININE 1.33 (H) 04/10/2023    CALCIUM 9.3 04/10/2023    AGAP 12.0 04/10/2023    ESTGFRAFRICA >60 01/15/2021    EGFRNONAA >60 01/15/2021        ABG  No results for input(s): PH, PO2, PCO2, HCO3, BE in the last 168 hours.    Mechanical Ventilation Support:       Significant Imaging:  I have reviewed the pertinent imaging within the past 24 hours.        Assessment/Plan:     Assessment  Fem-pop graft occlusion to the right lower extremity on Ekos and heparin drip  CAD/CABG x4:  LIMA to 2nd diagonal with sequential graft to the obtuse marginal, sequential SVG from distal circumflex to right PDA  PAF forward/ablation; chads Vasc score of 2 with last Xarelto dose 04/07/2023  Hypertension and hyperlipidemia      Plan  Observe patient in the ICU  Continue on heparin drip and ekos  Plan for catheterization in the morning  Continue management per vascular surgery/cardiology    DVT Prophylaxis: Heparin drip  GI Prophylaxis: Famotidine     32 minutes of critical care was time spent personally by me on the following activities: development of treatment plan with patient or surrogate and bedside caregivers, discussions with consultants, evaluation of patient's response to treatment, examination of patient, ordering and performing treatments and interventions, ordering and review of laboratory studies, ordering and review of radiographic studies, pulse oximetry, re-evaluation of patient's condition.  This critical care time did not overlap with that of any other provider or involve time for any procedures.     Chano Cisneros MD  Pulmonary Critical Care Medicine  [facility] - [facility] ICU

## 2023-04-10 NOTE — PROGRESS NOTES
[facility] - Emergency Dept  Cardiology  Progress Note    Patient Name: Yamil Escobedo  MRN: [id number]  Admission Date: 4/8/2023  Code Status: Full Code   Attending Provider: Suman Perales MD   Primary Care Physician: Jamari Gonzales MD  Principal Problem:<principal problem not specified>    Patient information was obtained from patient, past medical records, and ER records.     Subjective:   Chief Complaint:  RLE pain     HPI:   This is a 62-year-old male, who is known to Dr. Martinez and Dr. Lozano, with history of CAD/CABG x4, PAD/R fem-pop bypass and multiple interventions, PAF since ablation, HTN, ICMO, HLD.  He presented to [facility] on 04/08/2023 with complaints of right leg pain that started around 4:00 a.m. the morning of arrival.  He said the pain started initially upon ambulation and progressed to 8/10.  He said the pain was constant and radiated from the thigh to the calf.  He complained of associated numbness to the right lower extremity.  He said he took his home oral morphine without relief.  Arterial NIVA revealed moderately severe arterial flow reduction consistent with an occluded femoral artery bypass and dampened collateral flow identified in the distal popliteal artery through the posterior tibial and dorsalis pedis arteries.  Venous was negative for DVT.  CTA revealed occluded right femoral vein grafts with scattered flow in the deep femoral artery, popliteal artery, posterior tibial, and peroneal arteries.  [facility] has admitted the patient for severe PAD. His pain in the leg is much better now, basically gone, but he did recently get some pain meds.      Hospital Course:  4.10.23:  NAD Noted. Vitals Stable. On Heparin Infusion. NPO for Peripheral Angiogram.    PMH: CAD/CABG x4, PAD/R fem-pop bypass and multiple interventions, PAF since ablation, HTN, ICMO, HLD  PSH:  Right fem-pop bypass, peripheral angiogram/multiple interventions, CABG x4 (LIMA to 2nd diagonal with sequential graft to obtuse  marginal, sequential SVG from distal circumflex to right PDA), appendectomy  Family History:  Father-lung cancer; mother-HTN  Social History:  Former smoker, denies EtOH and illicit drug use    Previous Cardiac Diagnostics:   CT Angiogram Right Lower Extremity (4.8.23):  Impression:  Occluded right femoral vein grafts with scattered flow in the deep femoral artery, popliteal artery, posterior tibial and peroneal arteries.    RLE Arterial NIVA (4.8.23):  The right lower extremity demonstrated moderately severe arterial flow reduction consistent with an occluded femoral artery bypass.  Dampened collateral flow identified in the distal popliteal artery through posterior tibial and dorsalis pedis arteries.    RLE Venous NIVA (4.8.23):  There was no evidence of deep or superficial vein thrombosis in the right lower extremity    Peripheral Angiogram (2.23.22):  Old occluded fem-pop graft  Occluded SFA  Fem-tib graft with 80% distal stenosis status post PTA with 6 mm balloon reduced to 20% stenosis    Review of patient's allergies indicates:   Allergen Reactions    Ciprofloxacin     Iodinated contrast media      N & V.; CONFUSION    Sulfa (sulfonamide antibiotics)        No current facility-administered medications on file prior to encounter.     Current Outpatient Medications on File Prior to Encounter   Medication Sig    amLODIPine (NORVASC) 10 MG tablet Take 10 mg by mouth once daily.    carvediloL (COREG) 25 MG tablet Take 25 mg by mouth 2 (two) times daily with meals.    cilostazoL (PLETAL) 100 MG Tab Take 50 mg by mouth 2 (two) times daily.    clopidogreL (PLAVIX) 75 mg tablet Take 75 mg by mouth once daily.    docusate sodium (COLACE) 100 MG capsule Take 100 mg by mouth once daily. As directed    ezetimibe (ZETIA) 10 mg tablet Take 10 mg by mouth once daily.    folic acid (FOLVITE) 800 MCG Tab Take 800 mcg by mouth once daily.    hydrALAZINE (APRESOLINE) 100 MG tablet Take 100 mg by mouth 3 (three) times daily.     morphine 30 mg TRer Take 1 capsule by mouth 2 (two) times a day.    rivaroxaban (XARELTO) 20 mg Tab Take 20 mg by mouth once daily.     rosuvastatin (CRESTOR) 40 MG Tab Take 40 mg by mouth every evening.    sacubitriL-valsartan (ENTRESTO) 49-51 mg per tablet Take 1 tablet by mouth 2 (two) times daily.    amitriptyline (ELAVIL) 25 MG tablet Take 25 mg by mouth 2 (two) times daily as needed.     aspirin (ECOTRIN) 81 MG EC tablet Take 81 mg by mouth once daily.    busPIRone (BUSPAR) 10 MG tablet Take 10 mg by mouth daily as needed.    diphenoxylate-atropine 2.5-0.025 mg (LOMOTIL) 2.5-0.025 mg per tablet Take 2 tablets by mouth 4 (four) times daily as needed.    esomeprazole (NEXIUM) 40 MG capsule Take 40 mg by mouth once daily.    furosemide (LASIX) 40 MG tablet Take 40 mg by mouth daily as needed.    gabapentin (NEURONTIN) 300 MG capsule Take 300 mg by mouth 3 (three) times daily.     methocarbamoL (ROBAXIN) 750 MG Tab Take 1 tablet by mouth 3 (three) times daily.    nitroGLYCERIN (NITROSTAT) 0.4 MG SL tablet Place 0.4 mg under the tongue every 5 (five) minutes as needed for Chest pain.    oxyCODONE (ROXICODONE) 15 MG Tab Take 15 mg by mouth 2 (two) times a day.     tamsulosin (FLOMAX) 0.4 mg Cap Take 1 capsule by mouth once daily.    zolpidem (AMBIEN CR) 6.25 MG CR tablet Take 6.25 mg by mouth nightly as needed.     Review of Systems   Cardiovascular:  Negative for chest pain.   Respiratory:  Negative for shortness of breath.    Musculoskeletal:         Legs are Warm with no Current Leg Pain.   All other systems reviewed and are negative.    Objective:     Vital Signs (Most Recent):  Temp: 97.8 °F (36.6 °C) (04/10/23 0749)  Pulse: 73 (04/10/23 0749)  Resp: 20 (04/10/23 0749)  BP: (!) 169/74 (04/10/23 0835)  SpO2: 99 % (04/10/23 0749) Vital Signs (24h Range):  Temp:  [97.6 °F (36.4 °C)-99.4 °F (37.4 °C)] 97.8 °F (36.6 °C)  Pulse:  [70-76] 73  Resp:  [16-20] 20  SpO2:  [95 %-99 %] 99 %  BP: (112-169)/(62-86) 169/74      Weight: 73.5 kg (162 lb)  Body mass index is 26.96 kg/m².    SpO2: 99 %         Intake/Output Summary (Last 24 hours) at 4/10/2023 1005  Last data filed at 4/10/2023 0702  Gross per 24 hour   Intake 360 ml   Output 625 ml   Net -265 ml       Lines/Drains/Airways       Peripheral Intravenous Line  Duration                  Peripheral IV - Single Lumen 04/08/23 0912 20 G Posterior;Right Hand 2 days         Peripheral IV - Single Lumen 04/09/23 1605 20 G Anterior;Right Forearm <1 day                    Physical Exam  Vitals and nursing note reviewed.   Constitutional:       General: He is not in acute distress.     Appearance: Normal appearance. He is not ill-appearing.   HENT:      Head: Normocephalic.      Mouth/Throat:      Mouth: Mucous membranes are moist.      Pharynx: Oropharynx is clear.   Cardiovascular:      Rate and Rhythm: Normal rate and regular rhythm.      Heart sounds: Normal heart sounds.   Pulmonary:      Effort: Pulmonary effort is normal. No respiratory distress.      Breath sounds: Normal breath sounds. No wheezing or rales.   Abdominal:      General: There is no distension.      Palpations: Abdomen is soft.      Tenderness: There is no abdominal tenderness. There is no guarding.   Musculoskeletal:         General: No swelling.      Cervical back: Neck supple.      Right lower leg: No edema.      Left lower leg: No edema.      Comments: Bilateral Legs are Warm. Skin Color is Normal for Ethnicity.   Skin:     General: Skin is warm and dry.   Neurological:      General: No focal deficit present.      Mental Status: He is alert and oriented to person, place, and time. Mental status is at baseline.   Psychiatric:         Mood and Affect: Mood normal.         Behavior: Behavior normal.         Judgment: Judgment normal.     Significant Labs: CMP   Recent Labs   Lab 04/08/23  1057 04/09/23  0245 04/10/23  0931    138 137   K 4.8 4.0 5.4*   CO2 22* 21* 17*   BUN 19.6 19.5 20.3   CREATININE 1.21*  1.21* 1.33*   CALCIUM 9.5 9.2 9.3   ALBUMIN 4.2  --   --    BILITOT 0.5  --   --    ALKPHOS 46  --   --    AST 31  --   --    ALT 26  --   --    , CBC   Recent Labs   Lab 04/08/23  1057 04/09/23  0245 04/10/23  0335   WBC 4.9 7.2 4.5   HGB 12.5* 12.9* 12.1*   HCT 36.1* 36.7* 35.8*    144 155   , and Troponin No results for input(s): TROPONINI in the last 48 hours.    Significant Imaging:   Imaging Results              CTA Lower Extremity Right (Final result)  Result time 04/08/23 12:03:15      Final result by Arina Lagos MD (04/08/23 12:03:15)                   Impression:      Occluded right femoral vein grafts with scattered flow in the deep femoral artery, popliteal artery, posterior tibial and peroneal arteries.      Electronically signed by: Arina Lagos  Date:    04/08/2023  Time:    12:03               Narrative:    EXAMINATION:  CTA LOWER EXTREMITY RIGHT    CLINICAL HISTORY:  right leg pain, history of PAD, fem pop x 4;    TECHNIQUE:  Axial images were obtained through the right lower extremity with contrast in the arterial phase.    Coronal, sagittal, MIP and 3-D reconstructions obtained from the axial data set.    Radiation Dose:    Total DLP: 1302 mGy*cm    COMPARISON:  None available    FINDINGS:  There are extensive calcifications of the distal abdominal aorta and iliac branches.  There is moderate multifocal narrowing of the bilateral internal and external iliac arteries.  Bilateral external iliac artery stents are in place.    There is no flow within the right femoral vein grafts.  Flow is seen within the deep femoral artery with moderate multifocal narrowing.  There is some reconstitution of flow within the popliteal artery which, with a stent at the tibioperoneal trunk.  Flow is seen within the posterior tibial and peroneal arteries to the level of the ankle.  There are extensive calcifications along the anterior temporal artery with minimal flow identified.    The left femoral  artery graft appears to be patent.  The popliteal artery and tibioperoneal trunk are patent with calcifications.  The anterior tibial artery and peroneal artery appear patent.  The posterior tibial artery is not well opacified.                                  See Below MDM formulated by me (Jose Ha MD)  Assessment and Plan:   IMPRESSION:  PAD (Severe Bilateral) with Occluded right femoral vein grafts with scattered flow in the deep femoral artery, popliteal artery, posterior tibial and peroneal arteries (CTA Right Leg 4.8.23)    - History of Multiple Interventions    - On Heparin Infusion/Denies Leg Pain Currently  CAD/CABG x 4 (LIMA to 2nd diagonal with sequential graft to obtuse marginal, sequential SVG from distal circumflex to right PDA)  PAF/Ablation- Now SR  -[id number]  -On Xarelto, last dose 4.7.23  HTN  HLD  ICMO/EF 45%  Renal Insufficiency  Hyperkalemia- K+ 5.4  Iodine Allergy    - Received DYE PREP    PLAN:  Continue heparin gtt & DAPT  Hold Entresto for now given Elevated K+  Give Kayexylate PO x 1 Dose  Continue home medications. Hold Xarelto for upcoming peripheral angiogram  Plan for Peripheral Angio with Possible EKOS Catheter Placement today  Keep NPO Except Medications  Risk, Benefits and Alternatives Reviewed and Discussed with the PT and their Family and they wish to proceed with above Procedure.  Consent signed and on Chart.    ELIZABETH Rosas and Jose Ha MD  Cardiology  [facility] - Emergency Dept  04/10/2023 6:58 AM

## 2023-04-10 NOTE — PLAN OF CARE
04/10/23 1052   Discharge Assessment   Assessment Type Discharge Planning Assessment   Confirmed/corrected address, phone number and insurance Yes   Confirmed Demographics Correct on Facesheet   Source of Information patient  (Wife: Mag Escobedo [phone].)   Communicated SUJATHA with patient/caregiver Date not available/Unable to determine   Reason For Admission Leg pain; TRICIA (acute kidney injury); Femoral-popliteal bypass graft occlusion, right, sequela   People in Home spouse   Facility Arrived From: Private residence.   Do you expect to return to your current living situation? Yes   Do you have help at home or someone to help you manage your care at home? Yes   Who are your caregiver(s) and their phone number(s)? Wife: Mag Escobedo [phone].   Prior to hospitilization cognitive status: Alert/Oriented   Current cognitive status: Alert/Oriented   Walking or Climbing Stairs ambulation difficulty, requires equipment   Mobility Management Uses a walker, cane, W/C for mobility PRN.   Home Accessibility   (Home is built on a slab.)   Home Layout Able to live on 1st floor   Equipment Currently Used at Home bath bench;crutches;walker, standard;cane, straight;wheelchair   Readmission within 30 days? No   Patient currently being followed by outpatient case management? No   Do you currently have service(s) that help you manage your care at home? No   Do you take prescription medications? Yes   Do you have prescription coverage? Yes   Coverage Payor:  MEDICARE - MEDICARE PART A & B   Do you have any problems affording any of your prescribed medications? No   Is the patient taking medications as prescribed? yes   Who is going to help you get home at discharge? Wife: Mag Escobedo: [phone]   How do you get to doctors appointments? car, drives self;family or friend will provide   Are you on dialysis? No   Do you take coumadin? No   Discharge Plan A Home Health  (Has used [facility] in the past, not presently  active.)   Discharge Plan B Home Health;Home with family   DME Needed Upon Discharge  none   Discharge Plan discussed with: Patient  (Wife: Mag Escobedo)   Discharge Barriers Identified None   Social Connections   Are you , , , , never , or living with a partner?   ( x42 years.)   Alcohol Use   Q1: How often do you have a drink containing alcohol? Never   Q2: How many drinks containing alcohol do you have on a typical day when you are drinking? None   Q3: How often do you have six or more drinks on one occasion? Never   OTHER   Name(s) of People in Home Wife: Mag Escobedo

## 2023-04-11 LAB
ANION GAP SERPL CALC-SCNC: 11 MEQ/L
BASOPHILS # BLD AUTO: 0.01 X10(3)/MCL (ref 0–0.2)
BASOPHILS NFR BLD AUTO: 0.1 %
BUN SERPL-MCNC: 22.5 MG/DL (ref 8.4–25.7)
CALCIUM SERPL-MCNC: 9.1 MG/DL (ref 8.8–10)
CHLORIDE SERPL-SCNC: 107 MMOL/L (ref 98–107)
CO2 SERPL-SCNC: 21 MMOL/L (ref 23–31)
CREAT SERPL-MCNC: 1.12 MG/DL (ref 0.73–1.18)
CREAT/UREA NIT SERPL: 20
EOSINOPHIL # BLD AUTO: 0 X10(3)/MCL (ref 0–0.9)
EOSINOPHIL NFR BLD AUTO: 0 %
ERYTHROCYTE [DISTWIDTH] IN BLOOD BY AUTOMATED COUNT: 12 % (ref 11.5–17)
GFR SERPLBLD CREATININE-BSD FMLA CKD-EPI: >60 MLS/MIN/1.73/M2
GLUCOSE SERPL-MCNC: 150 MG/DL (ref 82–115)
HCT VFR BLD AUTO: 36.1 % (ref 42–52)
HGB BLD-MCNC: 12.5 G/DL (ref 14–18)
IMM GRANULOCYTES # BLD AUTO: 0.06 X10(3)/MCL (ref 0–0.04)
IMM GRANULOCYTES NFR BLD AUTO: 0.6 %
INR BLD: 0.96 (ref 0–1.3)
LYMPHOCYTES # BLD AUTO: 0.73 X10(3)/MCL (ref 0.6–4.6)
LYMPHOCYTES NFR BLD AUTO: 7.1 %
MAGNESIUM SERPL-MCNC: 1.9 MG/DL (ref 1.6–2.6)
MCH RBC QN AUTO: 32.1 PG (ref 27–31)
MCHC RBC AUTO-ENTMCNC: 34.6 G/DL (ref 33–36)
MCV RBC AUTO: 92.6 FL (ref 80–94)
MONOCYTES # BLD AUTO: 0.72 X10(3)/MCL (ref 0.1–1.3)
MONOCYTES NFR BLD AUTO: 7 %
NEUTROPHILS # BLD AUTO: 8.77 X10(3)/MCL (ref 2.1–9.2)
NEUTROPHILS NFR BLD AUTO: 85.2 %
NRBC BLD AUTO-RTO: 0 %
PLATELET # BLD AUTO: 148 X10(3)/MCL (ref 130–400)
PMV BLD AUTO: 10.3 FL (ref 7.4–10.4)
POCT GLUCOSE: 212 MG/DL (ref 70–110)
POTASSIUM SERPL-SCNC: 5 MMOL/L (ref 3.5–5.1)
PROTHROMBIN TIME: 12.7 SECONDS (ref 12.5–14.5)
RBC # BLD AUTO: 3.9 X10(6)/MCL (ref 4.7–6.1)
SODIUM SERPL-SCNC: 139 MMOL/L (ref 136–145)
WBC # SPEC AUTO: 10.3 X10(3)/MCL (ref 4.5–11.5)

## 2023-04-11 PROCEDURE — 80048 BASIC METABOLIC PNL TOTAL CA: CPT | Performed by: NURSE PRACTITIONER

## 2023-04-11 PROCEDURE — C1887 CATHETER, GUIDING: HCPCS | Performed by: INTERNAL MEDICINE

## 2023-04-11 PROCEDURE — 99152 MOD SED SAME PHYS/QHP 5/>YRS: CPT | Performed by: INTERNAL MEDICINE

## 2023-04-11 PROCEDURE — 20000000 HC ICU ROOM

## 2023-04-11 PROCEDURE — 63600175 PHARM REV CODE 636 W HCPCS: Performed by: NURSE PRACTITIONER

## 2023-04-11 PROCEDURE — 63600175 PHARM REV CODE 636 W HCPCS: Performed by: STUDENT IN AN ORGANIZED HEALTH CARE EDUCATION/TRAINING PROGRAM

## 2023-04-11 PROCEDURE — 83735 ASSAY OF MAGNESIUM: CPT | Performed by: NURSE PRACTITIONER

## 2023-04-11 PROCEDURE — 63600175 PHARM REV CODE 636 W HCPCS: Performed by: INTERNAL MEDICINE

## 2023-04-11 PROCEDURE — 63600175 PHARM REV CODE 636 W HCPCS: Mod: JZ,JG | Performed by: INTERNAL MEDICINE

## 2023-04-11 PROCEDURE — C1769 GUIDE WIRE: HCPCS | Performed by: INTERNAL MEDICINE

## 2023-04-11 PROCEDURE — 25000003 PHARM REV CODE 250: Performed by: NURSE PRACTITIONER

## 2023-04-11 PROCEDURE — 63600175 PHARM REV CODE 636 W HCPCS: Mod: JZ,JG | Performed by: STUDENT IN AN ORGANIZED HEALTH CARE EDUCATION/TRAINING PROGRAM

## 2023-04-11 PROCEDURE — 37213 THROMBLYTIC ART/VEN THERAPY: CPT | Performed by: INTERNAL MEDICINE

## 2023-04-11 PROCEDURE — 25000003 PHARM REV CODE 250: Performed by: STUDENT IN AN ORGANIZED HEALTH CARE EDUCATION/TRAINING PROGRAM

## 2023-04-11 PROCEDURE — 94761 N-INVAS EAR/PLS OXIMETRY MLT: CPT

## 2023-04-11 PROCEDURE — 99153 MOD SED SAME PHYS/QHP EA: CPT | Performed by: INTERNAL MEDICINE

## 2023-04-11 PROCEDURE — 75710 ARTERY X-RAYS ARM/LEG: CPT | Mod: 59 | Performed by: INTERNAL MEDICINE

## 2023-04-11 PROCEDURE — 27201423 OPTIME MED/SURG SUP & DEVICES STERILE SUPPLY: Performed by: INTERNAL MEDICINE

## 2023-04-11 PROCEDURE — 25000003 PHARM REV CODE 250: Performed by: INTERNAL MEDICINE

## 2023-04-11 PROCEDURE — 85610 PROTHROMBIN TIME: CPT | Performed by: NURSE PRACTITIONER

## 2023-04-11 PROCEDURE — 85025 COMPLETE CBC W/AUTO DIFF WBC: CPT | Performed by: NURSE PRACTITIONER

## 2023-04-11 PROCEDURE — 63600175 PHARM REV CODE 636 W HCPCS: Mod: JZ,JG | Performed by: NURSE PRACTITIONER

## 2023-04-11 PROCEDURE — 25500020 PHARM REV CODE 255: Performed by: INTERNAL MEDICINE

## 2023-04-11 RX ORDER — HEPARIN SODIUM 10000 [USP'U]/100ML
500 INJECTION, SOLUTION INTRAVENOUS CONTINUOUS
Status: DISCONTINUED | OUTPATIENT
Start: 2023-04-11 | End: 2023-04-12

## 2023-04-11 RX ORDER — DIPHENHYDRAMINE HCL 25 MG
25 CAPSULE ORAL EVERY 6 HOURS
Status: COMPLETED | OUTPATIENT
Start: 2023-04-11 | End: 2023-04-12

## 2023-04-11 RX ORDER — MIDAZOLAM HYDROCHLORIDE 1 MG/ML
INJECTION, SOLUTION INTRAMUSCULAR; INTRAVENOUS
Status: DISCONTINUED | OUTPATIENT
Start: 2023-04-11 | End: 2023-04-11 | Stop reason: HOSPADM

## 2023-04-11 RX ORDER — FENTANYL CITRATE 50 UG/ML
INJECTION, SOLUTION INTRAMUSCULAR; INTRAVENOUS
Status: DISCONTINUED | OUTPATIENT
Start: 2023-04-11 | End: 2023-04-11 | Stop reason: HOSPADM

## 2023-04-11 RX ORDER — MUPIROCIN 20 MG/G
OINTMENT TOPICAL 2 TIMES DAILY
Status: DISCONTINUED | OUTPATIENT
Start: 2023-04-11 | End: 2023-04-14 | Stop reason: HOSPADM

## 2023-04-11 RX ORDER — HEPARIN SODIUM 1000 [USP'U]/ML
INJECTION, SOLUTION INTRAVENOUS; SUBCUTANEOUS
Status: DISCONTINUED | OUTPATIENT
Start: 2023-04-11 | End: 2023-04-11 | Stop reason: HOSPADM

## 2023-04-11 RX ORDER — MAGNESIUM SULFATE HEPTAHYDRATE 40 MG/ML
2 INJECTION, SOLUTION INTRAVENOUS ONCE
Status: COMPLETED | OUTPATIENT
Start: 2023-04-11 | End: 2023-04-11

## 2023-04-11 RX ORDER — FAMOTIDINE 20 MG/1
20 TABLET, FILM COATED ORAL EVERY 6 HOURS
Status: COMPLETED | OUTPATIENT
Start: 2023-04-11 | End: 2023-04-12

## 2023-04-11 RX ORDER — CEFAZOLIN SODIUM 1 G/3ML
INJECTION, POWDER, FOR SOLUTION INTRAMUSCULAR; INTRAVENOUS
Status: DISCONTINUED | OUTPATIENT
Start: 2023-04-11 | End: 2023-04-11 | Stop reason: HOSPADM

## 2023-04-11 RX ORDER — HEPARIN SOD,PORCINE/0.9 % NACL 1000/500ML
30 INTRAVENOUS SOLUTION INTRAVENOUS ONCE
Status: DISCONTINUED | OUTPATIENT
Start: 2023-04-11 | End: 2023-04-12

## 2023-04-11 RX ORDER — PREDNISONE 50 MG/1
50 TABLET ORAL EVERY 6 HOURS
Status: DISCONTINUED | OUTPATIENT
Start: 2023-04-12 | End: 2023-04-11

## 2023-04-11 RX ORDER — PREDNISONE 50 MG/1
50 TABLET ORAL EVERY 6 HOURS
Status: COMPLETED | OUTPATIENT
Start: 2023-04-11 | End: 2023-04-12

## 2023-04-11 RX ADMIN — CARVEDILOL 25 MG: 12.5 TABLET, FILM COATED ORAL at 05:04

## 2023-04-11 RX ADMIN — SODIUM ZIRCONIUM CYCLOSILICATE 5 G: 5 POWDER, FOR SUSPENSION ORAL at 08:04

## 2023-04-11 RX ADMIN — Medication 6 MG: at 08:04

## 2023-04-11 RX ADMIN — CLOPIDOGREL BISULFATE 75 MG: 75 TABLET ORAL at 08:04

## 2023-04-11 RX ADMIN — CARVEDILOL 25 MG: 12.5 TABLET, FILM COATED ORAL at 08:04

## 2023-04-11 RX ADMIN — PREDNISONE 50 MG: 50 TABLET ORAL at 12:04

## 2023-04-11 RX ADMIN — MUPIROCIN: 20 OINTMENT TOPICAL at 08:04

## 2023-04-11 RX ADMIN — AMLODIPINE BESYLATE 10 MG: 5 TABLET ORAL at 08:04

## 2023-04-11 RX ADMIN — HYDRALAZINE HYDROCHLORIDE 100 MG: 50 TABLET, FILM COATED ORAL at 08:04

## 2023-04-11 RX ADMIN — DIPHENHYDRAMINE HYDROCHLORIDE 25 MG: 25 CAPSULE ORAL at 06:04

## 2023-04-11 RX ADMIN — CILOSTAZOL 50 MG: 50 TABLET ORAL at 08:04

## 2023-04-11 RX ADMIN — ASPIRIN 81 MG: 81 TABLET, COATED ORAL at 08:04

## 2023-04-11 RX ADMIN — HEPARIN SODIUM 500 UNITS/HR: 10000 INJECTION, SOLUTION INTRAVENOUS at 01:04

## 2023-04-11 RX ADMIN — METHOCARBAMOL TABLETS 750 MG: 750 TABLET, COATED ORAL at 08:04

## 2023-04-11 RX ADMIN — EZETIMIBE 10 MG: 10 TABLET ORAL at 08:04

## 2023-04-11 RX ADMIN — DIPHENHYDRAMINE HYDROCHLORIDE 25 MG: 25 CAPSULE ORAL at 05:04

## 2023-04-11 RX ADMIN — ALTEPLASE 1 MG/HR: KIT at 01:04

## 2023-04-11 RX ADMIN — ALTEPLASE 1 MG/HR: 2.2 INJECTION, POWDER, LYOPHILIZED, FOR SOLUTION INTRAVENOUS at 10:04

## 2023-04-11 RX ADMIN — ALTEPLASE: 2.2 INJECTION, POWDER, LYOPHILIZED, FOR SOLUTION INTRAVENOUS at 02:04

## 2023-04-11 RX ADMIN — OXYCODONE HYDROCHLORIDE 15 MG: 10 TABLET ORAL at 09:04

## 2023-04-11 RX ADMIN — GABAPENTIN 300 MG: 300 CAPSULE ORAL at 08:04

## 2023-04-11 RX ADMIN — HYDRALAZINE HYDROCHLORIDE 100 MG: 50 TABLET, FILM COATED ORAL at 04:04

## 2023-04-11 RX ADMIN — FAMOTIDINE 20 MG: 20 TABLET, FILM COATED ORAL at 06:04

## 2023-04-11 RX ADMIN — METHOCARBAMOL TABLETS 750 MG: 750 TABLET, COATED ORAL at 04:04

## 2023-04-11 RX ADMIN — HYDRALAZINE HYDROCHLORIDE 10 MG: 20 INJECTION INTRAMUSCULAR; INTRAVENOUS at 06:04

## 2023-04-11 RX ADMIN — FAMOTIDINE 20 MG: 20 TABLET, FILM COATED ORAL at 05:04

## 2023-04-11 RX ADMIN — PREDNISONE 50 MG: 50 TABLET ORAL at 05:04

## 2023-04-11 RX ADMIN — MAGNESIUM SULFATE HEPTAHYDRATE 2 G: 40 INJECTION, SOLUTION INTRAVENOUS at 09:04

## 2023-04-11 RX ADMIN — HYDRALAZINE HYDROCHLORIDE 10 MG: 20 INJECTION INTRAMUSCULAR; INTRAVENOUS at 02:04

## 2023-04-11 RX ADMIN — FAMOTIDINE 20 MG: 20 TABLET, FILM COATED ORAL at 12:04

## 2023-04-11 RX ADMIN — TAMSULOSIN HYDROCHLORIDE 0.4 MG: 0.4 CAPSULE ORAL at 08:04

## 2023-04-11 RX ADMIN — GABAPENTIN 300 MG: 300 CAPSULE ORAL at 04:04

## 2023-04-11 RX ADMIN — FOLIC ACID 1000 MCG: 1 TABLET ORAL at 08:04

## 2023-04-11 RX ADMIN — PREDNISONE 50 MG: 50 TABLET ORAL at 06:04

## 2023-04-11 RX ADMIN — ATORVASTATIN CALCIUM 80 MG: 40 TABLET, FILM COATED ORAL at 08:04

## 2023-04-11 RX ADMIN — OXYCODONE HYDROCHLORIDE 15 MG: 10 TABLET ORAL at 08:04

## 2023-04-11 RX ADMIN — DIPHENHYDRAMINE HYDROCHLORIDE 25 MG: 25 CAPSULE ORAL at 12:04

## 2023-04-11 NOTE — H&P
Patient name: Yamil Escobedo  MRN: [id number]  : 1960  Cath Lab Procedure H&P Update    Pre-Procedure Assessment:    I saw and examined the patient face to face. The patient has been re-evaluated and his condition is unchanged. The reason for admission, procedure and care is still present.  Based on the patients H&P, pre-procedure physical exam, relevant diagnostic studies, NPO status and information obtained from the patient, I determine the patient is an appropriate candidate for the proposed procedure and anesthesia planned. I further certify the anesthesia risks, benefits and options have been explained to the patient to which he agrees as documented on the procedural consent.    See scanned updated H&P and additional records from media tab.      Trent Shaffer

## 2023-04-11 NOTE — PROGRESS NOTES
[facility] - [facility]  Pulmonary Critical Care Note    Patient Name: Yamil Escobedo  MRN: [id number]  Admission Date: 4/8/2023  Hospital Length of Stay: 3 days  Code Status: Full Code  Attending Provider: Matt Alonzo MD  Primary Care Provider: Jamari Gonzales MD     Subjective:     HPI:   62-year-old male with a past medical history of CAD/CABG x4 in 2009, PAD status post right and left fem-pop bypass with multiple interventions, PAF status post ablation, hypertension, ICM, hyperlipidemia presents to [facility] on 04/08/2023 with complaints of severe right leg pain that started around 4:00 a.m. that morning.  Pain started initially upon ambulation and progressed to an 8/10 pain which radiated from the thigh to the calf.  Associated numbness and tingling to the right lower extremity.  Arterial NIVA revealed moderately severe arterial flow reduction consistent with an occluded femoral artery bypass and damp and collateral flow identified in the distal popliteal artery with the posterior tibial and dorsalis pedis arteries.  Follow-up CTA revealed occluded right femoral vein grafts with scattered flow in the deep femoral artery, popliteal artery, posterior tibial, peroneal arteries.  The patient was admitted and started on a heparin infusion.  Peripheral angiography was done thrombolytics given.  Ekos was initiated with plan to take patient to cath lab in the morning.  Patient currently denies no pain the right or left lower extremity.  He denies fever, chills, nausea, vomiting, diarrhea, back pain, chest pain, shortness a breath, weakness, rash and any other symptoms.    Hospital Course/Significant events:  Peripheral angiography 4/10/2023  RLE Ekos 4/10/2023    24 Hour Interval History:  No acute events overnight. No complaints this morning.  LE Pulses with Doppler intact.  Legs are warm and perfusing well at this time. Has been NPO since midnight for peripheral angiogram again today.     Past Medical  History:   Diagnosis Date    A-fib     Acid reflux     Acid reflux     Anemia     Anticoagulant long-term use     Anxiety     Cardiac arrest     Claudication     Coronary artery disease     Depression     DVT (deep venous thrombosis)     Embolus     Encounter for blood transfusion     Foot drop, right     Hemorrhoids     HLD (hyperlipidemia)     Hypertension     Insomnia     MI (myocardial infarction)     Numbness     R GREAT TOE ; PARTIAL LEFT FOOT    PAD (peripheral artery disease)     PVD (peripheral vascular disease)     Rhabdomyolysis     TIA (transient ischemic attack)     UTI (urinary tract infection)        Past Surgical History:   Procedure Laterality Date    ABLATION OF ARRHYTHMOGENIC FOCUS FOR ATRIAL FIBRILLATION      X 3    APPENDECTOMY      CABG X 4      CARDIAC ANGIOGRAM WITH STENTS      CARDIAC ARREST      DURING FEMORAL BYPASSES; 3 TIMES DURING CABG    EP STUDY      FEMORAL BYPASS Bilateral     TIMES 3    LASER ATHERECTOMY LEFT FEM-POP BYPASS GRAFT      PERCUTANEOUS TRANSLUMINAL ANGIOPLASTY (PTA) OF PERIPHERAL VESSEL N/A 2021    Procedure: PTA, PERIPHERAL VESSEL;  Surgeon: Melvin Martinez MD;  Location: [facility] CATH;  Service: Cardiology;  Laterality: N/A;    PERCUTANEOUS TRANSLUMINAL ANGIOPLASTY (PTA) OF PERIPHERAL VESSEL Left 2021    Procedure: PTA, PERIPHERAL VESSEL of left SFA utilizing distal protection;  Surgeon: Melvin Martinez MD;  Location: [facility] CATH;  Service: Cardiology;  Laterality: Left;       Social History     Socioeconomic History    Marital status:    Tobacco Use    Smoking status: Former     Types: Cigarettes     Quit date:      Years since quittin.2   Substance and Sexual Activity    Alcohol use: Not Currently     Comment: QUIT A LONG TIME AGO    Drug use: Not Currently     Social Determinants of Health     Social Connections: Unknown    Marital Status:            Current Outpatient Medications   Medication Instructions    amitriptyline (ELAVIL) 25 mg,  Oral, 2 times daily PRN    amLODIPine (NORVASC) 10 mg, Oral, Daily    aspirin (ECOTRIN) 81 mg, Oral, Daily    busPIRone (BUSPAR) 10 mg, Oral, Daily PRN    carvediloL (COREG) 25 mg, Oral, 2 times daily with meals    cilostazoL (PLETAL) 50 mg, Oral, 2 times daily    clopidogreL (PLAVIX) 75 mg, Oral, Daily    diphenoxylate-atropine 2.5-0.025 mg (LOMOTIL) 2.5-0.025 mg per tablet 2 tablets, Oral, 4 times daily PRN    docusate sodium (COLACE) 100 mg, Oral, Daily, As directed    esomeprazole (NEXIUM) 40 mg, Oral, Daily    ezetimibe (ZETIA) 10 mg, Oral, Daily    folic acid (FOLVITE) 800 mcg, Oral, Daily    furosemide (LASIX) 40 mg, Oral, Daily PRN    gabapentin (NEURONTIN) 300 mg, Oral, 3 times daily    hydrALAZINE (APRESOLINE) 100 mg, Oral, 3 times daily    methocarbamoL (ROBAXIN) 750 MG Tab 1 tablet, Oral, 3 times daily    morphine 30 mg TRer 1 capsule, Oral, 2 times daily    nitroGLYCERIN (NITROSTAT) 0.4 mg, Sublingual, Every 5 min PRN    oxyCODONE (ROXICODONE) 15 mg, Oral, 2 times daily    rosuvastatin (CRESTOR) 40 mg, Oral, Nightly    sacubitriL-valsartan (ENTRESTO) 49-51 mg per tablet 1 tablet, Oral, 2 times daily    tamsulosin (FLOMAX) 0.4 mg Cap 1 capsule, Oral, Daily    XARELTO 20 mg, Oral, Daily    zolpidem (AMBIEN CR) 6.25 mg, Oral, Nightly PRN       Current Inpatient Medications   amLODIPine  10 mg Oral Daily    aspirin  81 mg Oral Daily    atorvastatin  80 mg Oral QHS    carvediloL  25 mg Oral BID WM    cilostazoL  50 mg Oral BID    clopidogreL  75 mg Oral Daily    ezetimibe  10 mg Oral Daily    folic acid  1,000 mcg Oral Daily    gabapentin  300 mg Oral TID    hydrALAZINE  100 mg Oral TID    methocarbamoL  750 mg Oral TID    oxyCODONE  15 mg Oral BID    polyethylene glycol  17 g Oral Daily    tamsulosin  1 capsule Oral Daily       Current Intravenous Infusions   sodium chloride 0.9% 35 mL/hr at 04/10/23 1501    alteplase 12.5 mg in 0.9% NaCl 250 mL (CAR/VAS use only) 1 mg/hr (04/11/23 0105)    heparin  (porcine) in 5 % dex 400 Units/hr (04/10/23 1506)    lactated ringers 100 mL/hr at 04/08/23 1929         Review of Systems   Constitutional:  Negative for chills and fever.   HENT:  Negative for congestion.    Eyes:  Negative for redness.   Respiratory:  Negative for shortness of breath.    Cardiovascular:  Negative for chest pain and claudication.   Gastrointestinal:  Negative for abdominal pain, diarrhea, nausea and vomiting.   Genitourinary:  Negative for dysuria, hematuria and urgency.   Musculoskeletal:  Negative for back pain and neck pain.   Neurological:  Negative for dizziness, tingling, focal weakness, weakness and headaches.   Endo/Heme/Allergies:  Does not bruise/bleed easily.        Objective:       Intake/Output Summary (Last 24 hours) at 4/11/2023 0623  Last data filed at 4/11/2023 0200  Gross per 24 hour   Intake --   Output 2275 ml   Net -2275 ml           Vital Signs (Most Recent):  Temp: 97.7 °F (36.5 °C) (04/11/23 0400)  Pulse: 61 (04/11/23 0600)  Resp: 15 (04/11/23 0600)  BP: (!) 158/90 (04/11/23 0600)  SpO2: 95 % (04/11/23 0600)  Body mass index is 26.96 kg/m².  Weight: 73.5 kg (162 lb) Vital Signs (24h Range):  Temp:  [97.7 °F (36.5 °C)-98 °F (36.7 °C)] 97.7 °F (36.5 °C)  Pulse:  [61-91] 61  Resp:  [12-26] 15  SpO2:  [89 %-100 %] 95 %  BP: (112-173)/() 158/90     Physical Exam  Vitals and nursing note reviewed.   Constitutional:       General: He is not in acute distress.     Appearance: Normal appearance. He is not ill-appearing.   HENT:      Head: Normocephalic and atraumatic.      Right Ear: Tympanic membrane normal.      Left Ear: Tympanic membrane normal.      Nose: Nose normal.      Mouth/Throat:      Mouth: Mucous membranes are moist.      Pharynx: Oropharynx is clear.   Eyes:      Extraocular Movements: Extraocular movements intact.      Pupils: Pupils are equal, round, and reactive to light.   Cardiovascular:      Rate and Rhythm: Normal rate and regular rhythm.      Pulses:  Normal pulses.      Heart sounds: Normal heart sounds. No murmur heard.    No friction rub.      Comments: 2+ dorsalis pedis pulses on RLE and 1+ on LLE(audible through doppler)  )  Pulmonary:      Effort: Pulmonary effort is normal.      Breath sounds: Normal breath sounds.   Abdominal:      General: Abdomen is flat. Bowel sounds are normal. There is no distension.      Palpations: Abdomen is soft. There is no mass.      Tenderness: There is no abdominal tenderness. There is no guarding or rebound.      Hernia: No hernia is present.   Musculoskeletal:         General: No swelling, tenderness, deformity or signs of injury. Normal range of motion.      Cervical back: Normal range of motion and neck supple.      Right lower leg: No edema.      Left lower leg: No edema.      Comments: Bilateral lower extremities warm to touch.   Skin:     General: Skin is warm.      Findings: No bruising, erythema or rash.   Neurological:      General: No focal deficit present.      Mental Status: He is alert and oriented to person, place, and time. Mental status is at baseline.         Lines/Drains/Airways       Drain  Duration                  Sheath 04/10/23 1132 Left anterior;proximal <1 day    Male External Urinary Catheter 04/10/23 1400 <1 day              Peripheral Intravenous Line  Duration                  Peripheral IV - Single Lumen 04/08/23 0912 20 G Posterior;Right Hand 2 days         Peripheral IV - Single Lumen 04/09/23 1605 20 G Anterior;Right Forearm 1 day              Ultrasonic Catheter  Duration                  Ultrasonic Catheter <1 day                    Significant Labs:    Lab Results   Component Value Date    WBC 10.3 04/11/2023    HGB 12.5 (L) 04/11/2023    HCT 36.1 (L) 04/11/2023    MCV 92.6 04/11/2023     04/11/2023         BMP  Lab Results   Component Value Date     04/10/2023    K 5.4 (H) 04/10/2023    CHLORIDE 108 (H) 04/10/2023    CO2 17 (L) 04/10/2023    BUN 20.3 04/10/2023    CREATININE  1.33 (H) 04/10/2023    CALCIUM 9.3 04/10/2023    AGAP 12.0 04/10/2023    ESTGFRAFRICA >60 01/15/2021    EGFRNONAA >60 01/15/2021       ABG  No results for input(s): PH, PO2, PCO2, HCO3, BE in the last 168 hours.    Mechanical Ventilation Support:       Significant Imaging:  I have reviewed the pertinent imaging within the past 24 hours.        Assessment/Plan:     Assessment  Fem-pop graft occlusion to the right lower extremity on Ekos and heparin drip  CAD/CABG x4:  LIMA to 2nd diagonal with sequential graft to the obtuse marginal, sequential SVG from distal circumflex to right PDA  PAF forward/ablation; chads Vasc score of 2 with last Xarelto dose 04/07/2023  Hypertension and hyperlipidemia      Plan  Observe patient in the ICU  Continue on heparin drip and ekos  Plan for catheterization in the morning; keep pt NPO   Continue management per vascular surgery/cardiology  Lokelma 1 dose for hyperkalemia and hold entresto in the setting of lokelma     DVT Prophylaxis: Heparin drip  GI Prophylaxis: Famotidine     32 minutes of critical care was time spent personally by me on the following activities: development of treatment plan with patient or surrogate and bedside caregivers, discussions with consultants, evaluation of patient's response to treatment, examination of patient, ordering and performing treatments and interventions, ordering and review of laboratory studies, ordering and review of radiographic studies, pulse oximetry, re-evaluation of patient's condition.  This critical care time did not overlap with that of any other provider or involve time for any procedures.     Milla Cota DO  Pulmonary Critical Care Medicine  [facility] - [facility] ICU

## 2023-04-11 NOTE — NURSING
Nurses Note -- 4 Eyes      4/11/2023   5:51 PM      Skin assessed during: Daily Assessment      [x] No Pressure Injuries Present    [x]Prevention Measures Documented      [] Yes- Altered Skin Integrity Present or Discovered   [] LDA Added if Not in Epic (Describe Wound)   [] New Altered Skin Integrity was Present on Admit and Documented in LDA   [] Wound Image Taken    Wound Care Consulted? No    Attending Nurse:  Claribel Sarah RN     Second RN/Staff Member:  Rosario Tejaad RN

## 2023-04-11 NOTE — NURSING
Nurses Note -- 4 Eyes      4/11/2023   4:15 AM      Skin assessed during: Daily Assessment      [x] No Pressure Injuries Present    [x]Prevention Measures Documented      [] Yes- Altered Skin Integrity Present or Discovered   [] LDA Added if Not in Epic (Describe Wound)   [] New Altered Skin Integrity was Present on Admit and Documented in LDA   [] Wound Image Taken    Wound Care Consulted? No    Attending Nurse:  Kelsy Madrigal RN     Second RN/Staff Member:  Shantelle Silva RN

## 2023-04-11 NOTE — PROGRESS NOTES
[facility] - Emergency Dept  Cardiology  Progress Note    Patient Name: Yamil Escobedo  MRN: [id number]  Admission Date: 4/8/2023  Code Status: Full Code   Attending Provider: Matt Alonzo MD   Primary Care Physician: Jamari Gonzales MD  Principal Problem:<principal problem not specified>    Patient information was obtained from patient, past medical records, and ER records.     Subjective:   Chief Complaint:  RLE pain     HPI:   This is a 62-year-old male, who is known to Dr. Martinez and Dr. Lozano, with history of CAD/CABG x4, PAD/R fem-pop bypass and multiple interventions, PAF since ablation, HTN, ICMO, HLD.  He presented to [facility] on 04/08/2023 with complaints of right leg pain that started around 4:00 a.m. the morning of arrival.  He said the pain started initially upon ambulation and progressed to 8/10.  He said the pain was constant and radiated from the thigh to the calf.  He complained of associated numbness to the right lower extremity.  He said he took his home oral morphine without relief.  Arterial NIVA revealed moderately severe arterial flow reduction consistent with an occluded femoral artery bypass and dampened collateral flow identified in the distal popliteal artery through the posterior tibial and dorsalis pedis arteries.  Venous was negative for DVT.  CTA revealed occluded right femoral vein grafts with scattered flow in the deep femoral artery, popliteal artery, posterior tibial, and peroneal arteries.  [facility] has admitted the patient for severe PAD. His pain in the leg is much better now, basically gone, but he did recently get some pain meds.      Hospital Course:  4.10.23:  NAD Noted. Vitals Stable. On Heparin Infusion. NPO for Peripheral Angiogram.  4.11.23: NAD Noted. Vitals Stable. Hypertensive. SR on Tele. Legs warm with Doppler Pulses. Left Groin Soft.     PMH: CAD/CABG x4, PAD/R fem-pop bypass and multiple interventions, PAF since ablation, HTN, ICMO, HLD  PSH:  Right  fem-pop bypass, peripheral angiogram/multiple interventions, CABG x4 (LIMA to 2nd diagonal with sequential graft to obtuse marginal, sequential SVG from distal circumflex to right PDA), appendectomy  Family History:  Father-lung cancer; mother-HTN  Social History:  Former smoker, denies EtOH and illicit drug use    Previous Cardiac Diagnostics:   CT Angiogram Right Lower Extremity (4.8.23):  Impression:  Occluded right femoral vein grafts with scattered flow in the deep femoral artery, popliteal artery, posterior tibial and peroneal arteries.    RLE Arterial NIVA (4.8.23):  The right lower extremity demonstrated moderately severe arterial flow reduction consistent with an occluded femoral artery bypass.  Dampened collateral flow identified in the distal popliteal artery through posterior tibial and dorsalis pedis arteries.    RLE Venous NIVA (4.8.23):  There was no evidence of deep or superficial vein thrombosis in the right lower extremity    Peripheral Angiogram (2.23.22):  Old occluded fem-pop graft  Occluded SFA  Fem-tib graft with 80% distal stenosis status post PTA with 6 mm balloon reduced to 20% stenosis    Review of patient's allergies indicates:   Allergen Reactions    Ciprofloxacin     Iodinated contrast media      N & V.; CONFUSION    Sulfa (sulfonamide antibiotics)        No current facility-administered medications on file prior to encounter.     Current Outpatient Medications on File Prior to Encounter   Medication Sig    amLODIPine (NORVASC) 10 MG tablet Take 10 mg by mouth once daily.    carvediloL (COREG) 25 MG tablet Take 25 mg by mouth 2 (two) times daily with meals.    cilostazoL (PLETAL) 100 MG Tab Take 50 mg by mouth 2 (two) times daily.    clopidogreL (PLAVIX) 75 mg tablet Take 75 mg by mouth once daily.    docusate sodium (COLACE) 100 MG capsule Take 100 mg by mouth once daily. As directed    ezetimibe (ZETIA) 10 mg tablet Take 10 mg by mouth once daily.    folic acid (FOLVITE) 800 MCG Tab  Take 800 mcg by mouth once daily.    hydrALAZINE (APRESOLINE) 100 MG tablet Take 100 mg by mouth 3 (three) times daily.    morphine 30 mg TRer Take 1 capsule by mouth 2 (two) times a day.    rivaroxaban (XARELTO) 20 mg Tab Take 20 mg by mouth once daily.     rosuvastatin (CRESTOR) 40 MG Tab Take 40 mg by mouth every evening.    sacubitriL-valsartan (ENTRESTO) 49-51 mg per tablet Take 1 tablet by mouth 2 (two) times daily.    amitriptyline (ELAVIL) 25 MG tablet Take 25 mg by mouth 2 (two) times daily as needed.     aspirin (ECOTRIN) 81 MG EC tablet Take 81 mg by mouth once daily.    busPIRone (BUSPAR) 10 MG tablet Take 10 mg by mouth daily as needed.    diphenoxylate-atropine 2.5-0.025 mg (LOMOTIL) 2.5-0.025 mg per tablet Take 2 tablets by mouth 4 (four) times daily as needed.    esomeprazole (NEXIUM) 40 MG capsule Take 40 mg by mouth once daily.    furosemide (LASIX) 40 MG tablet Take 40 mg by mouth daily as needed.    gabapentin (NEURONTIN) 300 MG capsule Take 300 mg by mouth 3 (three) times daily.     methocarbamoL (ROBAXIN) 750 MG Tab Take 1 tablet by mouth 3 (three) times daily.    nitroGLYCERIN (NITROSTAT) 0.4 MG SL tablet Place 0.4 mg under the tongue every 5 (five) minutes as needed for Chest pain.    oxyCODONE (ROXICODONE) 15 MG Tab Take 15 mg by mouth 2 (two) times a day.     tamsulosin (FLOMAX) 0.4 mg Cap Take 1 capsule by mouth once daily.    zolpidem (AMBIEN CR) 6.25 MG CR tablet Take 6.25 mg by mouth nightly as needed.     Review of Systems   Cardiovascular:  Negative for chest pain.   Respiratory:  Negative for shortness of breath.    Musculoskeletal:         Legs are Warm with no Current Leg Pain.   All other systems reviewed and are negative.    Objective:     Vital Signs (Most Recent):  Temp: 98.1 °F (36.7 °C) (04/11/23 0800)  Pulse: 76 (04/11/23 0830)  Resp: 16 (04/11/23 0830)  BP: (!) 165/88 (04/11/23 0830)  SpO2: 99 % (04/11/23 0830) Vital Signs (24h Range):  Temp:  [97.7 °F (36.5 °C)-98.1 °F  (36.7 °C)] 98.1 °F (36.7 °C)  Pulse:  [61-91] 76  Resp:  [12-26] 16  SpO2:  [89 %-100 %] 99 %  BP: (112-173)/() 165/88     Weight: 73.5 kg (162 lb)  Body mass index is 26.96 kg/m².    SpO2: 99 %         Intake/Output Summary (Last 24 hours) at 4/11/2023 0842  Last data filed at 4/11/2023 0200  Gross per 24 hour   Intake --   Output 1850 ml   Net -1850 ml         Lines/Drains/Airways       Drain  Duration                  Sheath 04/10/23 1132 Left anterior;proximal <1 day    Male External Urinary Catheter 04/10/23 1400 <1 day              Peripheral Intravenous Line  Duration                  Peripheral IV - Single Lumen 04/08/23 0912 20 G Posterior;Right Hand 2 days         Peripheral IV - Single Lumen 04/09/23 1605 20 G Anterior;Right Forearm 1 day              Ultrasonic Catheter  Duration                  Ultrasonic Catheter <1 day                    Physical Exam  Vitals and nursing note reviewed.   Constitutional:       General: He is not in acute distress.     Appearance: Normal appearance. He is not ill-appearing.   HENT:      Head: Normocephalic.      Mouth/Throat:      Mouth: Mucous membranes are moist.      Pharynx: Oropharynx is clear.   Cardiovascular:      Rate and Rhythm: Normal rate and regular rhythm.      Heart sounds: Normal heart sounds.      Comments: Sinus Rhythm  Pulmonary:      Effort: Pulmonary effort is normal. No respiratory distress.      Breath sounds: Normal breath sounds. No wheezing or rales.   Abdominal:      General: There is no distension.      Palpations: Abdomen is soft.      Tenderness: There is no abdominal tenderness. There is no guarding.   Musculoskeletal:         General: No swelling.      Cervical back: Neck supple.      Right lower leg: No edema.      Left lower leg: No edema.      Comments: Bilateral Legs are Warm. Skin Color is Normal for Ethnicity.   Skin:     General: Skin is warm and dry.      Comments: Left Groin soft  with EKOS Catheter in Place. Groin is soft  and flat. No Evidence of bleed or hematoma noted. Bilateral DP/PT Pulses Intact with Doppler.   Neurological:      General: No focal deficit present.      Mental Status: He is alert and oriented to person, place, and time. Mental status is at baseline.   Psychiatric:         Mood and Affect: Mood normal.         Behavior: Behavior normal.         Judgment: Judgment normal.     Significant Labs: CMP   Recent Labs   Lab 04/10/23  0931 04/11/23  0421    139   K 5.4* 5.0   CO2 17* 21*   BUN 20.3 22.5   CREATININE 1.33* 1.12   CALCIUM 9.3 9.1     , CBC   Recent Labs   Lab 04/10/23  0335 04/11/23  0214   WBC 4.5 10.3   HGB 12.1* 12.5*   HCT 35.8* 36.1*    148     , and Troponin No results for input(s): TROPONINI in the last 48 hours.    Significant Imaging:   Imaging Results              CTA Lower Extremity Right (Final result)  Result time 04/08/23 12:03:15      Final result by Arina Lagos MD (04/08/23 12:03:15)                   Impression:      Occluded right femoral vein grafts with scattered flow in the deep femoral artery, popliteal artery, posterior tibial and peroneal arteries.      Electronically signed by: Arina Lagos  Date:    04/08/2023  Time:    12:03               Narrative:    EXAMINATION:  CTA LOWER EXTREMITY RIGHT    CLINICAL HISTORY:  right leg pain, history of PAD, fem pop x 4;    TECHNIQUE:  Axial images were obtained through the right lower extremity with contrast in the arterial phase.    Coronal, sagittal, MIP and 3-D reconstructions obtained from the axial data set.    Radiation Dose:    Total DLP: 1302 mGy*cm    COMPARISON:  None available    FINDINGS:  There are extensive calcifications of the distal abdominal aorta and iliac branches.  There is moderate multifocal narrowing of the bilateral internal and external iliac arteries.  Bilateral external iliac artery stents are in place.    There is no flow within the right femoral vein grafts.  Flow is seen within the deep  femoral artery with moderate multifocal narrowing.  There is some reconstitution of flow within the popliteal artery which, with a stent at the tibioperoneal trunk.  Flow is seen within the posterior tibial and peroneal arteries to the level of the ankle.  There are extensive calcifications along the anterior temporal artery with minimal flow identified.    The left femoral artery graft appears to be patent.  The popliteal artery and tibioperoneal trunk are patent with calcifications.  The anterior tibial artery and peroneal artery appear patent.  The posterior tibial artery is not well opacified.                                  See Below MDM formulated by me (Jose Ha MD)  Assessment and Plan:   IMPRESSION:  PAD (Severe Bilateral) with Occluded right femoral vein grafts with scattered flow in the deep femoral artery, popliteal artery, posterior tibial and peroneal arteries (CTA Right Leg 4.8.23)    - Status Post Right Lower Extremity Revascularization with EKOS Catheter Placement & Thrombolytic Therapy (4.10.23)    - History of Multiple Interventions  CAD/CABG x 4 (LIMA to 2nd diagonal with sequential graft to obtuse marginal, sequential SVG from distal circumflex to right PDA)  PAF/Ablation- Now SR  [id number]  -On Xarelto, last dose 4.7.23  HTN  HLD  ICMO/EF 45%  Renal Insufficiency (Improved)  Hyperkalemia (Resolved)  Iodine Allergy    - Received DYE PREP on 4.10.23 (For Initial Study) & 4.11.23 (For Restudy)    PLAN:  Continue EKOS Catheter Directed Thrombolytic Therapy & DAPT  Continue home medications. Xarelto on hold for peripheral angiogram  Plan for Peripheral Restudy Today   Keep NPO Except Medications  Dye Prep Given  Labs in AM (CBC/CMP/Mag Level)    ELIZABETH Rosas and Tacos Daly MD  Cardiology  [facility] - Emergency Dept  04/11/2023 6:58 AM    Patient seen, examined and discussed with the NP/PA and I agree with the findings as listed above. Medical decision making  were done under my supervision.    Continue EKOS and directed thrombolytic therapy  Xarelto on hold for peripheral angiogram

## 2023-04-11 NOTE — PLAN OF CARE
Problem: Adult Inpatient Plan of Care  Goal: Plan of Care Review  Outcome: Ongoing, Progressing  Goal: Absence of Hospital-Acquired Illness or Injury  Outcome: Ongoing, Progressing  Goal: Optimal Comfort and Wellbeing  Outcome: Ongoing, Progressing  Goal: Readiness for Transition of Care  Outcome: Ongoing, Progressing     Problem: Fall Injury Risk  Goal: Absence of Fall and Fall-Related Injury  Outcome: Ongoing, Progressing     Problem: Pain Acute  Goal: Acceptable Pain Control and Functional Ability  Outcome: Ongoing, Progressing     Problem: Infection  Goal: Absence of Infection Signs and Symptoms  Outcome: Ongoing, Progressing

## 2023-04-12 LAB
ANION GAP SERPL CALC-SCNC: 9 MEQ/L
BASOPHILS # BLD AUTO: 0.01 X10(3)/MCL (ref 0–0.2)
BASOPHILS NFR BLD AUTO: 0.1 %
BUN SERPL-MCNC: 25.9 MG/DL (ref 8.4–25.7)
CALCIUM SERPL-MCNC: 9.3 MG/DL (ref 8.8–10)
CHLORIDE SERPL-SCNC: 107 MMOL/L (ref 98–107)
CO2 SERPL-SCNC: 20 MMOL/L (ref 23–31)
CREAT SERPL-MCNC: 1.2 MG/DL (ref 0.73–1.18)
CREAT/UREA NIT SERPL: 22
EOSINOPHIL # BLD AUTO: 0 X10(3)/MCL (ref 0–0.9)
EOSINOPHIL NFR BLD AUTO: 0 %
ERYTHROCYTE [DISTWIDTH] IN BLOOD BY AUTOMATED COUNT: 12.1 % (ref 11.5–17)
GFR SERPLBLD CREATININE-BSD FMLA CKD-EPI: >60 MLS/MIN/1.73/M2
GLUCOSE SERPL-MCNC: 149 MG/DL (ref 82–115)
HCT VFR BLD AUTO: 40 % (ref 42–52)
HGB BLD-MCNC: 13.5 G/DL (ref 14–18)
IMM GRANULOCYTES # BLD AUTO: 0.11 X10(3)/MCL (ref 0–0.04)
IMM GRANULOCYTES NFR BLD AUTO: 1 %
LYMPHOCYTES # BLD AUTO: 0.88 X10(3)/MCL (ref 0.6–4.6)
LYMPHOCYTES NFR BLD AUTO: 7.8 %
MAGNESIUM SERPL-MCNC: 2.3 MG/DL (ref 1.6–2.6)
MCH RBC QN AUTO: 30.9 PG (ref 27–31)
MCHC RBC AUTO-ENTMCNC: 33.8 G/DL (ref 33–36)
MCV RBC AUTO: 91.5 FL (ref 80–94)
MONOCYTES # BLD AUTO: 0.76 X10(3)/MCL (ref 0.1–1.3)
MONOCYTES NFR BLD AUTO: 6.7 %
NEUTROPHILS # BLD AUTO: 9.54 X10(3)/MCL (ref 2.1–9.2)
NEUTROPHILS NFR BLD AUTO: 84.4 %
NRBC BLD AUTO-RTO: 0 %
PLATELET # BLD AUTO: 151 X10(3)/MCL (ref 130–400)
PMV BLD AUTO: 10.7 FL (ref 7.4–10.4)
POC ACTIVATED CLOTTING TIME K: 137 SEC (ref 74–137)
POCT GLUCOSE: 107 MG/DL (ref 70–110)
POCT GLUCOSE: 136 MG/DL (ref 70–110)
POTASSIUM SERPL-SCNC: 4.7 MMOL/L (ref 3.5–5.1)
RBC # BLD AUTO: 4.37 X10(6)/MCL (ref 4.7–6.1)
SAMPLE: NORMAL
SODIUM SERPL-SCNC: 136 MMOL/L (ref 136–145)
WBC # SPEC AUTO: 11.3 X10(3)/MCL (ref 4.5–11.5)

## 2023-04-12 PROCEDURE — 25000003 PHARM REV CODE 250: Performed by: NURSE PRACTITIONER

## 2023-04-12 PROCEDURE — 63600175 PHARM REV CODE 636 W HCPCS: Performed by: STUDENT IN AN ORGANIZED HEALTH CARE EDUCATION/TRAINING PROGRAM

## 2023-04-12 PROCEDURE — 25000003 PHARM REV CODE 250: Performed by: STUDENT IN AN ORGANIZED HEALTH CARE EDUCATION/TRAINING PROGRAM

## 2023-04-12 PROCEDURE — 80048 BASIC METABOLIC PNL TOTAL CA: CPT | Performed by: NURSE PRACTITIONER

## 2023-04-12 PROCEDURE — 63600175 PHARM REV CODE 636 W HCPCS: Mod: JZ,JG | Performed by: NURSE PRACTITIONER

## 2023-04-12 PROCEDURE — 63600175 PHARM REV CODE 636 W HCPCS: Performed by: INTERNAL MEDICINE

## 2023-04-12 PROCEDURE — 21400001 HC TELEMETRY ROOM

## 2023-04-12 PROCEDURE — 85025 COMPLETE CBC W/AUTO DIFF WBC: CPT | Performed by: NURSE PRACTITIONER

## 2023-04-12 PROCEDURE — 75710 ARTERY X-RAYS ARM/LEG: CPT | Performed by: INTERNAL MEDICINE

## 2023-04-12 PROCEDURE — 25000003 PHARM REV CODE 250: Performed by: INTERNAL MEDICINE

## 2023-04-12 PROCEDURE — 83735 ASSAY OF MAGNESIUM: CPT | Performed by: NURSE PRACTITIONER

## 2023-04-12 PROCEDURE — 63600175 PHARM REV CODE 636 W HCPCS: Performed by: NURSE PRACTITIONER

## 2023-04-12 PROCEDURE — 25000003 PHARM REV CODE 250: Performed by: EMERGENCY MEDICINE

## 2023-04-12 PROCEDURE — 25500020 PHARM REV CODE 255: Performed by: INTERNAL MEDICINE

## 2023-04-12 PROCEDURE — C1769 GUIDE WIRE: HCPCS | Performed by: INTERNAL MEDICINE

## 2023-04-12 PROCEDURE — 36245 INS CATH ABD/L-EXT ART 1ST: CPT | Performed by: INTERNAL MEDICINE

## 2023-04-12 RX ORDER — ENALAPRILAT 1.25 MG/ML
1.25 INJECTION INTRAVENOUS EVERY 6 HOURS
Status: DISCONTINUED | OUTPATIENT
Start: 2023-04-12 | End: 2023-04-13

## 2023-04-12 RX ORDER — LABETALOL HYDROCHLORIDE 5 MG/ML
20 INJECTION, SOLUTION INTRAVENOUS
Status: DISCONTINUED | OUTPATIENT
Start: 2023-04-12 | End: 2023-04-14 | Stop reason: HOSPADM

## 2023-04-12 RX ORDER — ACETAMINOPHEN 325 MG/1
650 TABLET ORAL EVERY 6 HOURS PRN
Status: DISCONTINUED | OUTPATIENT
Start: 2023-04-12 | End: 2023-04-14 | Stop reason: HOSPADM

## 2023-04-12 RX ORDER — ACETAMINOPHEN 325 MG/1
650 TABLET ORAL EVERY 4 HOURS PRN
Status: DISCONTINUED | OUTPATIENT
Start: 2023-04-12 | End: 2023-04-14 | Stop reason: HOSPADM

## 2023-04-12 RX ORDER — DIPHENHYDRAMINE HYDROCHLORIDE 50 MG/ML
INJECTION INTRAMUSCULAR; INTRAVENOUS
Status: DISCONTINUED | OUTPATIENT
Start: 2023-04-12 | End: 2023-04-12 | Stop reason: HOSPADM

## 2023-04-12 RX ORDER — SODIUM CHLORIDE 9 MG/ML
100 INJECTION, SOLUTION INTRAVENOUS CONTINUOUS
Status: ACTIVE | OUTPATIENT
Start: 2023-04-12 | End: 2023-04-12

## 2023-04-12 RX ADMIN — PREDNISONE 50 MG: 50 TABLET ORAL at 12:04

## 2023-04-12 RX ADMIN — ALTEPLASE 1 MG/HR: 2.2 INJECTION, POWDER, LYOPHILIZED, FOR SOLUTION INTRAVENOUS at 12:04

## 2023-04-12 RX ADMIN — AMLODIPINE BESYLATE 10 MG: 5 TABLET ORAL at 08:04

## 2023-04-12 RX ADMIN — GABAPENTIN 300 MG: 300 CAPSULE ORAL at 08:04

## 2023-04-12 RX ADMIN — MUPIROCIN: 20 OINTMENT TOPICAL at 08:04

## 2023-04-12 RX ADMIN — OXYCODONE HYDROCHLORIDE 15 MG: 10 TABLET ORAL at 08:04

## 2023-04-12 RX ADMIN — METHOCARBAMOL TABLETS 750 MG: 750 TABLET, COATED ORAL at 08:04

## 2023-04-12 RX ADMIN — ENALAPRILAT 1.25 MG: 2.5 INJECTION INTRAVENOUS at 12:04

## 2023-04-12 RX ADMIN — ACETAMINOPHEN 325MG 650 MG: 325 TABLET ORAL at 03:04

## 2023-04-12 RX ADMIN — FOLIC ACID 1000 MCG: 1 TABLET ORAL at 08:04

## 2023-04-12 RX ADMIN — ASPIRIN 81 MG: 81 TABLET, COATED ORAL at 08:04

## 2023-04-12 RX ADMIN — ACETAMINOPHEN 325MG 650 MG: 325 TABLET ORAL at 07:04

## 2023-04-12 RX ADMIN — DIPHENHYDRAMINE HYDROCHLORIDE 25 MG: 25 CAPSULE ORAL at 06:04

## 2023-04-12 RX ADMIN — DIPHENHYDRAMINE HYDROCHLORIDE 25 MG: 25 CAPSULE ORAL at 12:04

## 2023-04-12 RX ADMIN — CLOPIDOGREL BISULFATE 75 MG: 75 TABLET ORAL at 08:04

## 2023-04-12 RX ADMIN — HYDRALAZINE HYDROCHLORIDE 100 MG: 50 TABLET, FILM COATED ORAL at 08:04

## 2023-04-12 RX ADMIN — CILOSTAZOL 50 MG: 50 TABLET ORAL at 08:04

## 2023-04-12 RX ADMIN — CILOSTAZOL 50 MG: 50 TABLET ORAL at 10:04

## 2023-04-12 RX ADMIN — TAMSULOSIN HYDROCHLORIDE 0.4 MG: 0.4 CAPSULE ORAL at 08:04

## 2023-04-12 RX ADMIN — METHOCARBAMOL TABLETS 750 MG: 750 TABLET, COATED ORAL at 03:04

## 2023-04-12 RX ADMIN — PREDNISONE 50 MG: 50 TABLET ORAL at 06:04

## 2023-04-12 RX ADMIN — ATORVASTATIN CALCIUM 80 MG: 40 TABLET, FILM COATED ORAL at 08:04

## 2023-04-12 RX ADMIN — ENALAPRILAT 1.25 MG: 2.5 INJECTION INTRAVENOUS at 05:04

## 2023-04-12 RX ADMIN — EZETIMIBE 10 MG: 10 TABLET ORAL at 08:04

## 2023-04-12 RX ADMIN — Medication 6 MG: at 08:04

## 2023-04-12 RX ADMIN — GABAPENTIN 300 MG: 300 CAPSULE ORAL at 03:04

## 2023-04-12 RX ADMIN — CARVEDILOL 25 MG: 12.5 TABLET, FILM COATED ORAL at 08:04

## 2023-04-12 RX ADMIN — FAMOTIDINE 20 MG: 20 TABLET, FILM COATED ORAL at 12:04

## 2023-04-12 RX ADMIN — HYDRALAZINE HYDROCHLORIDE 100 MG: 50 TABLET, FILM COATED ORAL at 03:04

## 2023-04-12 RX ADMIN — CARVEDILOL 25 MG: 12.5 TABLET, FILM COATED ORAL at 05:04

## 2023-04-12 RX ADMIN — FAMOTIDINE 20 MG: 20 TABLET, FILM COATED ORAL at 06:04

## 2023-04-12 RX ADMIN — HYDRALAZINE HYDROCHLORIDE 10 MG: 20 INJECTION INTRAMUSCULAR; INTRAVENOUS at 01:04

## 2023-04-12 RX ADMIN — MORPHINE SULFATE 30 MG: 15 TABLET ORAL at 01:04

## 2023-04-12 RX ADMIN — POLYETHYLENE GLYCOL 3350 17 G: 17 POWDER, FOR SOLUTION ORAL at 08:04

## 2023-04-12 RX ADMIN — HYDRALAZINE HYDROCHLORIDE 10 MG: 20 INJECTION INTRAMUSCULAR; INTRAVENOUS at 03:04

## 2023-04-12 RX ADMIN — ENALAPRILAT 1.25 MG: 2.5 INJECTION INTRAVENOUS at 04:04

## 2023-04-12 NOTE — PROGRESS NOTES
[facility]  Cardiology  Progress Note    Patient Name: Yamil Escobedo  MRN: [id number]  Admission Date: 4/8/2023  Code Status: Full Code   Attending Provider: Mtat Alonzo MD   Primary Care Physician: Jamari Gonzales MD  Principal Problem:<principal problem not specified>    Patient information was obtained from patient, past medical records, and ER records.     Subjective:   Chief Complaint: RLE Pain     HPI: This is a 62-year-old male, who is known to Dr. Martinez and Dr. Lozano, with history of CAD/CABG x4, PAD/R fem-pop bypass and multiple interventions, PAF since ablation, HTN, ICMO, HLD.  He presented to [facility] on 04/08/2023 with complaints of right leg pain that started around 4:00 a.m. the morning of arrival.  He said the pain started initially upon ambulation and progressed to 8/10.  He said the pain was constant and radiated from the thigh to the calf.  He complained of associated numbness to the right lower extremity.  He said he took his home oral morphine without relief.  Arterial NIVA revealed moderately severe arterial flow reduction consistent with an occluded femoral artery bypass and dampened collateral flow identified in the distal popliteal artery through the posterior tibial and dorsalis pedis arteries.  Venous was negative for DVT.  CTA revealed occluded right femoral vein grafts with scattered flow in the deep femoral artery, popliteal artery, posterior tibial, and peroneal arteries.  [facility] has admitted the patient for severe PAD. His pain in the leg is much better now, basically gone, but he did recently get some pain meds.      Hospital Course:  4.10.23:  NAD Noted. Vitals Stable. On Heparin Infusion. NPO for Peripheral Angiogram.  4.11.23: NAD Noted. Vitals Stable. Hypertensive. SR on Tele. Legs warm with Doppler Pulses. Left Groin Soft.   4.12.23: NAD. VSS. L Groin tPA Infusion/Ck Catheter. SR. BLE Warm with Doppler Pulses. NPO for Restudy.     PMH: CAD/CABG x4, PAD/R  fem-pop bypass and multiple interventions, PAF since ablation, HTN, ICMO, HLD  PSH:  Right fem-pop bypass, peripheral angiogram/multiple interventions, CABG x4 (LIMA to 2nd diagonal with sequential graft to obtuse marginal, sequential SVG from distal circumflex to right PDA), appendectomy  Family History:  Father-lung cancer; mother-HTN  Social History:  Former smoker, denies EtOH and illicit drug use    Previous Cardiac Diagnostics:   CT Angiogram Right Lower Extremity (4.8.23):  Impression:  Occluded right femoral vein grafts with scattered flow in the deep femoral artery, popliteal artery, posterior tibial and peroneal arteries.    RLE Arterial NIVA (4.8.23):  The right lower extremity demonstrated moderately severe arterial flow reduction consistent with an occluded femoral artery bypass.  Dampened collateral flow identified in the distal popliteal artery through posterior tibial and dorsalis pedis arteries.    RLE Venous NIVA (4.8.23):  There was no evidence of deep or superficial vein thrombosis in the right lower extremity    Peripheral Angiogram (2.23.22):  Old occluded fem-pop graft  Occluded SFA  Fem-tib graft with 80% distal stenosis status post PTA with 6 mm balloon reduced to 20% stenosis    Review of patient's allergies indicates:   Allergen Reactions    Ciprofloxacin     Iodinated contrast media      N & V.; CONFUSION    Sulfa (sulfonamide antibiotics)        No current facility-administered medications on file prior to encounter.     Current Outpatient Medications on File Prior to Encounter   Medication Sig    amLODIPine (NORVASC) 10 MG tablet Take 10 mg by mouth once daily.    carvediloL (COREG) 25 MG tablet Take 25 mg by mouth 2 (two) times daily with meals.    cilostazoL (PLETAL) 100 MG Tab Take 50 mg by mouth 2 (two) times daily.    clopidogreL (PLAVIX) 75 mg tablet Take 75 mg by mouth once daily.    docusate sodium (COLACE) 100 MG capsule Take 100 mg by mouth once daily. As directed    ezetimibe  (ZETIA) 10 mg tablet Take 10 mg by mouth once daily.    folic acid (FOLVITE) 800 MCG Tab Take 800 mcg by mouth once daily.    hydrALAZINE (APRESOLINE) 100 MG tablet Take 100 mg by mouth 3 (three) times daily.    morphine 30 mg TRer Take 1 capsule by mouth 2 (two) times a day.    rivaroxaban (XARELTO) 20 mg Tab Take 20 mg by mouth once daily.     rosuvastatin (CRESTOR) 40 MG Tab Take 40 mg by mouth every evening.    sacubitriL-valsartan (ENTRESTO) 49-51 mg per tablet Take 1 tablet by mouth 2 (two) times daily.    amitriptyline (ELAVIL) 25 MG tablet Take 25 mg by mouth 2 (two) times daily as needed.     aspirin (ECOTRIN) 81 MG EC tablet Take 81 mg by mouth once daily.    busPIRone (BUSPAR) 10 MG tablet Take 10 mg by mouth daily as needed.    diphenoxylate-atropine 2.5-0.025 mg (LOMOTIL) 2.5-0.025 mg per tablet Take 2 tablets by mouth 4 (four) times daily as needed.    esomeprazole (NEXIUM) 40 MG capsule Take 40 mg by mouth once daily.    furosemide (LASIX) 40 MG tablet Take 40 mg by mouth daily as needed.    gabapentin (NEURONTIN) 300 MG capsule Take 300 mg by mouth 3 (three) times daily.     methocarbamoL (ROBAXIN) 750 MG Tab Take 1 tablet by mouth 3 (three) times daily.    nitroGLYCERIN (NITROSTAT) 0.4 MG SL tablet Place 0.4 mg under the tongue every 5 (five) minutes as needed for Chest pain.    oxyCODONE (ROXICODONE) 15 MG Tab Take 15 mg by mouth 2 (two) times a day.     tamsulosin (FLOMAX) 0.4 mg Cap Take 1 capsule by mouth once daily.    zolpidem (AMBIEN CR) 6.25 MG CR tablet Take 6.25 mg by mouth nightly as needed.     Review of Systems   Constitutional: Positive for malaise/fatigue. Negative for fever.   Cardiovascular:  Negative for chest pain.   Respiratory:  Negative for shortness of breath.    Musculoskeletal:         Legs are Warm; Denies BLE Pain   All other systems reviewed and are negative.    Objective:     Vital Signs (Most Recent):  Temp: 97.7 °F (36.5 °C) (04/12/23 0830)  Pulse: 72 (04/12/23  1415)  Resp: 19 (04/12/23 1415)  BP: 105/63 (04/12/23 1401)  SpO2: 99 % (04/12/23 1415) Vital Signs (24h Range):  Temp:  [97.7 °F (36.5 °C)-98.4 °F (36.9 °C)] 97.7 °F (36.5 °C)  Pulse:  [62-79] 72  Resp:  [10-29] 19  SpO2:  [93 %-100 %] 99 %  BP: (105-192)/() 105/63     Weight: 73.5 kg (162 lb)  Body mass index is 26.96 kg/m².    SpO2: 99 %         Intake/Output Summary (Last 24 hours) at 4/12/2023 1539  Last data filed at 4/12/2023 0800  Gross per 24 hour   Intake 147.59 ml   Output 2400 ml   Net -2252.41 ml       Lines/Drains/Airways       Drain  Duration                  Sheath 04/10/23 1132 Left anterior;proximal 2 days    Male External Urinary Catheter 04/10/23 1400 2 days              Peripheral Intravenous Line  Duration                  Peripheral IV - Single Lumen 04/12/23 0335 20 G Anterior;Left Shoulder <1 day         Peripheral IV - Single Lumen 04/12/23 0345 20 G Anterior;Proximal;Right Forearm <1 day              Ultrasonic Catheter  Duration                  Ultrasonic Catheter 1 day                  Physical Exam  Vitals reviewed.   Constitutional:       General: He is not in acute distress.     Appearance: Normal appearance. He is not ill-appearing.   HENT:      Head: Normocephalic.      Mouth/Throat:      Mouth: Mucous membranes are moist.   Eyes:      Extraocular Movements: Extraocular movements intact.   Cardiovascular:      Rate and Rhythm: Normal rate and regular rhythm.      Heart sounds: Normal heart sounds.   Pulmonary:      Effort: Pulmonary effort is normal. No respiratory distress.      Breath sounds: Normal breath sounds.   Abdominal:      Palpations: Abdomen is soft.   Musculoskeletal:         General: No swelling.      Cervical back: Neck supple.      Right lower leg: No edema.      Left lower leg: No edema.      Comments: Bilateral Legs are Warm. Skin Color is Normal for Ethnicity.   Skin:     General: Skin is warm and dry.      Comments: Left Groin Soft/Flat with tPA Infusion  Catheter in Place. No Evidence of Bleed, Infection or Hematoma noted. Bilateral DP/PT Pulses Intact with Doppler.   Neurological:      General: No focal deficit present.      Mental Status: He is alert and oriented to person, place, and time.   Psychiatric:         Mood and Affect: Mood normal.         Behavior: Behavior normal.     Significant Labs: CMP   Recent Labs   Lab 04/11/23  0421 04/12/23  0134    136   K 5.0 4.7   CO2 21* 20*   BUN 22.5 25.9*   CREATININE 1.12 1.20*   CALCIUM 9.1 9.3     , CBC   Recent Labs   Lab 04/11/23  0214 04/12/23  0134   WBC 10.3 11.3   HGB 12.5* 13.5*   HCT 36.1* 40.0*    151     , and Troponin No results for input(s): TROPONINI in the last 48 hours.    Significant Imaging:   Imaging Results              CTA Lower Extremity Right (Final result)  Result time 04/08/23 12:03:15      Final result by Arina Lagos MD (04/08/23 12:03:15)                   Impression:      Occluded right femoral vein grafts with scattered flow in the deep femoral artery, popliteal artery, posterior tibial and peroneal arteries.      Electronically signed by: Arina Lagos  Date:    04/08/2023  Time:    12:03               Narrative:    EXAMINATION:  CTA LOWER EXTREMITY RIGHT    CLINICAL HISTORY:  right leg pain, history of PAD, fem pop x 4;    TECHNIQUE:  Axial images were obtained through the right lower extremity with contrast in the arterial phase.    Coronal, sagittal, MIP and 3-D reconstructions obtained from the axial data set.    Radiation Dose:    Total DLP: 1302 mGy*cm    COMPARISON:  None available    FINDINGS:  There are extensive calcifications of the distal abdominal aorta and iliac branches.  There is moderate multifocal narrowing of the bilateral internal and external iliac arteries.  Bilateral external iliac artery stents are in place.    There is no flow within the right femoral vein grafts.  Flow is seen within the deep femoral artery with moderate multifocal  narrowing.  There is some reconstitution of flow within the popliteal artery which, with a stent at the tibioperoneal trunk.  Flow is seen within the posterior tibial and peroneal arteries to the level of the ankle.  There are extensive calcifications along the anterior temporal artery with minimal flow identified.    The left femoral artery graft appears to be patent.  The popliteal artery and tibioperoneal trunk are patent with calcifications.  The anterior tibial artery and peroneal artery appear patent.  The posterior tibial artery is not well opacified.                                    Assessment and Plan:   PAD (Severe Bilateral) with Occluded right femoral vein grafts with scattered flow in the deep femoral artery, popliteal artery, posterior tibial and peroneal arteries (CTA Right Leg 4.8.23)    - Status Post Restudy (4.11.23) and Placement of Cragg Ck Infusion tPA Catheter    - Status Post Right Lower Extremity Revascularization with EKOS Catheter Placement & Thrombolytic Therapy (4.10.23)    - History of Multiple Interventions  CAD/CABG x 4 (LIMA to 2nd diagonal with sequential graft to obtuse marginal, sequential SVG from distal circumflex to right PDA)  PAF/Ablation- Now SR    [id number]    -On Xarelto, last dose 4.7.23  HTN  HLD  ICMO/EF 45%  Renal Insufficiency (Improved)  Hyperkalemia (Resolved)  Iodine Allergy    - Received DYE PREP on 4.10.23 (For Initial Study) & 4.11.23 (For Restudy) & 4.12.23 (For Restudy)    PLAN:  Continue Cragg Ck tPA Infusion Catheter & DAPT  Continue Home Medications.   Xarelto on Hold for PA Restudy Today (4.12.23)  Keep NPO Except Medications  Dye Prep Orally Given  Labs in AM: CBC, CMP and Mg    ESTEBAN Coppola   Cardiology  [facility]   04/12/2023     I have seen the patient, reviewed the Nurse Practitioner's note, assessment and plan. I have personally interviewed and examined the patient at bedside and agree with the findings.  Medical decision making listed above were done under my guidance.  Restudy again today  Continue tPA infusion and DAPT

## 2023-04-12 NOTE — NURSING
Nurses Note -- 4 Eyes      4/12/2023   4:47 AM      Skin assessed during: Daily Assessment      [x] No Pressure Injuries Present    [x]Prevention Measures Documented      [] Yes- Altered Skin Integrity Present or Discovered   [] LDA Added if Not in Epic (Describe Wound)   [] New Altered Skin Integrity was Present on Admit and Documented in LDA   [] Wound Image Taken    Wound Care Consulted? No    Attending Nurse:  Kelsy Madrigal RN     Second RN/Staff Member:  Tomas Reddy RN

## 2023-04-12 NOTE — PROGRESS NOTES
[facility] - [facility]  Pulmonary Critical Care Note    Patient Name: Yamil Escobedo  MRN: [id number]  Admission Date: 4/8/2023  Hospital Length of Stay: 4 days  Code Status: Full Code  Attending Provider: Matt Alonzo MD  Primary Care Provider: Jamari Gonzales MD     Subjective:     HPI:   62-year-old male with a past medical history of CAD/CABG x4 in 2009, PAD status post right and left fem-pop bypass with multiple interventions, PAF status post ablation, hypertension, ICM, hyperlipidemia presents to [facility] on 04/08/2023 with complaints of severe right leg pain that started around 4:00 a.m. that morning.  Pain started initially upon ambulation and progressed to an 8/10 pain which radiated from the thigh to the calf.  Associated numbness and tingling to the right lower extremity.  Arterial NIVA revealed moderately severe arterial flow reduction consistent with an occluded femoral artery bypass and damp and collateral flow identified in the distal popliteal artery with the posterior tibial and dorsalis pedis arteries.  Follow-up CTA revealed occluded right femoral vein grafts with scattered flow in the deep femoral artery, popliteal artery, posterior tibial, peroneal arteries.  The patient was admitted and started on a heparin infusion.  Peripheral angiography was done thrombolytics given.  Ekos was initiated.  Patient currently denies no pain the right or left lower extremity.  He denies fever, chills, nausea, vomiting, diarrhea, back pain, chest pain, shortness a breath, weakness, rash and any other symptoms.    Hospital Course/Significant events:  Peripheral angiography 4/10/2023 with occluded right femoral graft   RLE Ekos 4/10/2023  EKOS removed on 4/11 and TPA infusing through groin sheath now     24 Hour Interval History:  Plans for going back to cath lab today. He had a rough night and is having a hard time laying flat. Otherwise, no issues overnight.     Past Medical History:   Diagnosis  Date    A-fib     Acid reflux     Acid reflux     Anemia     Anticoagulant long-term use     Anxiety     Cardiac arrest     Claudication     Coronary artery disease     Depression     DVT (deep venous thrombosis)     Embolus     Encounter for blood transfusion     Foot drop, right     Hemorrhoids     HLD (hyperlipidemia)     Hypertension     Insomnia     MI (myocardial infarction)     Numbness     R GREAT TOE ; PARTIAL LEFT FOOT    PAD (peripheral artery disease)     PVD (peripheral vascular disease)     Rhabdomyolysis     TIA (transient ischemic attack)     UTI (urinary tract infection)        Past Surgical History:   Procedure Laterality Date    ABLATION OF ARRHYTHMOGENIC FOCUS FOR ATRIAL FIBRILLATION      X 3    AORTOGRAPHY WITH SERIALOGRAPHY N/A 4/11/2023    Procedure: AORTOGRAM, WITH SERIALOGRAPHY;  Surgeon: Trent Shaffer MD;  Location: [facility] CATH LAB;  Service: Cardiology;  Laterality: N/A;    APPENDECTOMY      CABG X 4      CARDIAC ANGIOGRAM WITH STENTS      CARDIAC ARREST      DURING FEMORAL BYPASSES; 3 TIMES DURING CABG    EP STUDY      FEMORAL BYPASS Bilateral     TIMES 3    LASER ATHERECTOMY LEFT FEM-POP BYPASS GRAFT      PERCUTANEOUS TRANSLUMINAL ANGIOPLASTY (PTA) OF PERIPHERAL VESSEL N/A 1/14/2021    Procedure: PTA, PERIPHERAL VESSEL;  Surgeon: Melvin Martinez MD;  Location: [facility] CATH;  Service: Cardiology;  Laterality: N/A;    PERCUTANEOUS TRANSLUMINAL ANGIOPLASTY (PTA) OF PERIPHERAL VESSEL Left 8/4/2021    Procedure: PTA, PERIPHERAL VESSEL of left SFA utilizing distal protection;  Surgeon: Melvin Martinez MD;  Location: [facility] CATH;  Service: Cardiology;  Laterality: Left;    PERIPHERAL ANGIOGRAPHY Right 4/10/2023    Procedure: Peripheral angiography;  Surgeon: Franky Rae Jr., MD;  Location: [facility] CATH LAB;  Service: Cardiology;  Laterality: Right;    THROMBOLYSIS, PERIPHERAL BLOOD VESSEL N/A 4/10/2023    Procedure: Thrombolysis-peripheral;  Surgeon: Franky Rae Jr., MD;  Location: [facility] CATH  LAB;  Service: Cardiology;  Laterality: N/A;       Social History     Socioeconomic History    Marital status:    Tobacco Use    Smoking status: Former     Types: Cigarettes     Quit date:      Years since quittin.2   Substance and Sexual Activity    Alcohol use: Not Currently     Comment: QUIT A LONG TIME AGO    Drug use: Not Currently     Social Determinants of Health     Social Connections: Unknown    Marital Status:            Current Outpatient Medications   Medication Instructions    amitriptyline (ELAVIL) 25 mg, Oral, 2 times daily PRN    amLODIPine (NORVASC) 10 mg, Oral, Daily    aspirin (ECOTRIN) 81 mg, Oral, Daily    busPIRone (BUSPAR) 10 mg, Oral, Daily PRN    carvediloL (COREG) 25 mg, Oral, 2 times daily with meals    cilostazoL (PLETAL) 50 mg, Oral, 2 times daily    clopidogreL (PLAVIX) 75 mg, Oral, Daily    diphenoxylate-atropine 2.5-0.025 mg (LOMOTIL) 2.5-0.025 mg per tablet 2 tablets, Oral, 4 times daily PRN    docusate sodium (COLACE) 100 mg, Oral, Daily, As directed    esomeprazole (NEXIUM) 40 mg, Oral, Daily    ezetimibe (ZETIA) 10 mg, Oral, Daily    folic acid (FOLVITE) 800 mcg, Oral, Daily    furosemide (LASIX) 40 mg, Oral, Daily PRN    gabapentin (NEURONTIN) 300 mg, Oral, 3 times daily    hydrALAZINE (APRESOLINE) 100 mg, Oral, 3 times daily    methocarbamoL (ROBAXIN) 750 MG Tab 1 tablet, Oral, 3 times daily    morphine 30 mg TRer 1 capsule, Oral, 2 times daily    nitroGLYCERIN (NITROSTAT) 0.4 mg, Sublingual, Every 5 min PRN    oxyCODONE (ROXICODONE) 15 mg, Oral, 2 times daily    rosuvastatin (CRESTOR) 40 mg, Oral, Nightly    sacubitriL-valsartan (ENTRESTO) 49-51 mg per tablet 1 tablet, Oral, 2 times daily    tamsulosin (FLOMAX) 0.4 mg Cap 1 capsule, Oral, Daily    XARELTO 20 mg, Oral, Daily    zolpidem (AMBIEN CR) 6.25 mg, Oral, Nightly PRN       Current Inpatient Medications   amLODIPine  10 mg Oral Daily    aspirin  81 mg Oral Daily    atorvastatin  80 mg Oral QHS     carvediloL  25 mg Oral BID WM    cilostazoL  50 mg Oral BID    clopidogreL  75 mg Oral Daily    enalaprilat  1.25 mg Intravenous Q6H    ezetimibe  10 mg Oral Daily    folic acid  1,000 mcg Oral Daily    gabapentin  300 mg Oral TID    heparin (porcine)  30 mL Other Once    hydrALAZINE  100 mg Oral TID    methocarbamoL  750 mg Oral TID    mupirocin   Nasal BID    oxyCODONE  15 mg Oral BID    polyethylene glycol  17 g Oral Daily    tamsulosin  1 capsule Oral Daily       Current Intravenous Infusions   sodium chloride 0.9% 35 mL/hr at 04/10/23 1501    alteplase 12.5 mg in 0.9% NaCl 250 mL (CAR/VAS use only) 1 mg/hr (04/11/23 2235)    heparin (porcine) in 5 % dex 500 Units/hr (04/11/23 1300)    lactated ringers 100 mL/hr at 04/08/23 1929         Review of Systems   Constitutional:  Negative for chills and fever.   HENT:  Negative for congestion.    Eyes:  Negative for redness.   Respiratory:  Negative for shortness of breath.    Cardiovascular:  Negative for chest pain and claudication.   Gastrointestinal:  Negative for abdominal pain, diarrhea, nausea and vomiting.   Genitourinary:  Negative for dysuria, hematuria and urgency.   Musculoskeletal:  Negative for back pain and neck pain.   Neurological:  Negative for dizziness, tingling, focal weakness, weakness and headaches.   Endo/Heme/Allergies:  Does not bruise/bleed easily.        Objective:       Intake/Output Summary (Last 24 hours) at 4/12/2023 0926  Last data filed at 4/12/2023 0800  Gross per 24 hour   Intake 147.59 ml   Output 2900 ml   Net -2752.41 ml           Vital Signs (Most Recent):  Temp: 97.7 °F (36.5 °C) (04/12/23 0830)  Pulse: 70 (04/12/23 0830)  Resp: 18 (04/12/23 0830)  BP: (!) 156/90 (04/12/23 0830)  SpO2: 97 % (04/12/23 0830)  Body mass index is 26.96 kg/m².  Weight: 73.5 kg (162 lb) Vital Signs (24h Range):  Temp:  [97.7 °F (36.5 °C)-98.4 °F (36.9 °C)] 97.7 °F (36.5 °C)  Pulse:  [62-79] 70  Resp:  [10-27] 18  SpO2:  [95 %-100 %] 97 %  BP:  (121-192)/() 156/90     Physical Exam  Vitals and nursing note reviewed.   Constitutional:       General: He is not in acute distress.     Appearance: Normal appearance. He is not ill-appearing.   HENT:      Head: Normocephalic and atraumatic.      Right Ear: Tympanic membrane normal.      Left Ear: Tympanic membrane normal.      Nose: Nose normal.      Mouth/Throat:      Mouth: Mucous membranes are moist.      Pharynx: Oropharynx is clear.   Eyes:      Extraocular Movements: Extraocular movements intact.      Pupils: Pupils are equal, round, and reactive to light.   Cardiovascular:      Rate and Rhythm: Normal rate and regular rhythm.      Pulses: Normal pulses.      Heart sounds: Normal heart sounds. No murmur heard.    No friction rub.      Comments: 2+ dorsalis pedis pulses on RLE and 1+ on LLE(audible through doppler)  )  Pulmonary:      Effort: Pulmonary effort is normal.      Breath sounds: Normal breath sounds.   Abdominal:      General: Abdomen is flat. Bowel sounds are normal. There is no distension.      Palpations: Abdomen is soft. There is no mass.      Tenderness: There is no abdominal tenderness. There is no guarding or rebound.      Hernia: No hernia is present.   Musculoskeletal:         General: No swelling, tenderness, deformity or signs of injury. Normal range of motion.      Cervical back: Normal range of motion and neck supple.      Right lower leg: No edema.      Left lower leg: No edema.      Comments: Bilateral lower extremities warm to touch. Right groin sheath site intact.    Skin:     General: Skin is warm.      Findings: No bruising, erythema or rash.   Neurological:      General: No focal deficit present.      Mental Status: He is alert and oriented to person, place, and time. Mental status is at baseline.         Lines/Drains/Airways       Drain  Duration                  Sheath 04/10/23 1132 Left anterior;proximal 1 day    Male External Urinary Catheter 04/10/23 1400 1 day               Peripheral Intravenous Line  Duration                  Peripheral IV - Single Lumen 04/12/23 0335 20 G Anterior;Left Shoulder <1 day         Peripheral IV - Single Lumen 04/12/23 0345 20 G Anterior;Proximal;Right Forearm <1 day              Ultrasonic Catheter  Duration                  Ultrasonic Catheter <1 day                    Significant Labs:    Lab Results   Component Value Date    WBC 11.3 04/12/2023    HGB 13.5 (L) 04/12/2023    HCT 40.0 (L) 04/12/2023    MCV 91.5 04/12/2023     04/12/2023         BMP  Lab Results   Component Value Date     04/12/2023    K 4.7 04/12/2023    CHLORIDE 107 04/12/2023    CO2 20 (L) 04/12/2023    BUN 25.9 (H) 04/12/2023    CREATININE 1.20 (H) 04/12/2023    CALCIUM 9.3 04/12/2023    AGAP 9.0 04/12/2023    ESTGFRAFRICA >60 01/15/2021    EGFRNONAA >60 01/15/2021       ABG  No results for input(s): PH, PO2, PCO2, HCO3, BE in the last 168 hours.    Mechanical Ventilation Support:       Significant Imaging:  I have reviewed the pertinent imaging within the past 24 hours.        Assessment/Plan:     Assessment  Fem-pop graft occlusion to the right lower extremity s/p EKOS  Coronary artery disease s/p previous CABG  PAF previously on xarelto   Hypertension and hyperlipidemia      Plan  Observe patient in the ICU  Continue alteplase infusion through groin sheath and plans to go back to cath lab today per cardiology        32 minutes of critical care was time spent personally by me on the following activities: development of treatment plan with patient or surrogate and bedside caregivers, discussions with consultants, evaluation of patient's response to treatment, examination of patient, ordering and performing treatments and interventions, ordering and review of laboratory studies, ordering and review of radiographic studies, pulse oximetry, re-evaluation of patient's condition.  This critical care time did not overlap with that of any other provider or involve time  for any procedures.     ELIZABETH Ferguson  Pulmonary Critical Care Medicine  [facility] - [facility]

## 2023-04-13 LAB
ABS NEUT (OLG): 8.09 X10(3)/MCL (ref 2.1–9.2)
ALBUMIN SERPL-MCNC: 3.9 G/DL (ref 3.4–4.8)
ALBUMIN/GLOB SERPL: 1.3 RATIO (ref 1.1–2)
ALP SERPL-CCNC: 44 UNIT/L (ref 40–150)
ALT SERPL-CCNC: 16 UNIT/L (ref 0–55)
AST SERPL-CCNC: 17 UNIT/L (ref 5–34)
BILIRUBIN DIRECT+TOT PNL SERPL-MCNC: 0.3 MG/DL
BUN SERPL-MCNC: 33.3 MG/DL (ref 8.4–25.7)
CALCIUM SERPL-MCNC: 9 MG/DL (ref 8.8–10)
CHLORIDE SERPL-SCNC: 107 MMOL/L (ref 98–107)
CO2 SERPL-SCNC: 21 MMOL/L (ref 23–31)
CREAT SERPL-MCNC: 1.47 MG/DL (ref 0.73–1.18)
ERYTHROCYTE [DISTWIDTH] IN BLOOD BY AUTOMATED COUNT: 12.1 % (ref 11.5–17)
GFR SERPLBLD CREATININE-BSD FMLA CKD-EPI: 53 MLS/MIN/1.73/M2
GLOBULIN SER-MCNC: 3 GM/DL (ref 2.4–3.5)
GLUCOSE SERPL-MCNC: 110 MG/DL (ref 82–115)
HCT VFR BLD AUTO: 41.7 % (ref 42–52)
HGB BLD-MCNC: 13.7 G/DL (ref 14–18)
INSTRUMENT WBC (OLG): 10.5 X10(3)/MCL
LYMPHOCYTES NFR BLD MANUAL: 1.47 X10(3)/MCL
LYMPHOCYTES NFR BLD MANUAL: 14 %
MAGNESIUM SERPL-MCNC: 2.7 MG/DL (ref 1.6–2.6)
MCH RBC QN AUTO: 30.9 PG (ref 27–31)
MCHC RBC AUTO-ENTMCNC: 32.9 G/DL (ref 33–36)
MCV RBC AUTO: 94.1 FL (ref 80–94)
MONOCYTES NFR BLD MANUAL: 1.05 X10(3)/MCL (ref 0.1–1.3)
MONOCYTES NFR BLD MANUAL: 10 %
NEUTROPHILS NFR BLD MANUAL: 77 %
NRBC BLD AUTO-RTO: 0 %
PLATELET # BLD AUTO: 138 X10(3)/MCL (ref 130–400)
PLATELET # BLD EST: ABNORMAL 10*3/UL
PMV BLD AUTO: 10.6 FL (ref 7.4–10.4)
POCT GLUCOSE: 71 MG/DL (ref 70–110)
POTASSIUM SERPL-SCNC: 5.3 MMOL/L (ref 3.5–5.1)
PROT SERPL-MCNC: 6.9 GM/DL (ref 5.8–7.6)
RBC # BLD AUTO: 4.43 X10(6)/MCL (ref 4.7–6.1)
RBC MORPH BLD: NORMAL
SODIUM SERPL-SCNC: 137 MMOL/L (ref 136–145)
WBC # SPEC AUTO: 10.5 X10(3)/MCL (ref 4.5–11.5)

## 2023-04-13 PROCEDURE — 25000003 PHARM REV CODE 250: Performed by: INTERNAL MEDICINE

## 2023-04-13 PROCEDURE — 25000003 PHARM REV CODE 250: Performed by: STUDENT IN AN ORGANIZED HEALTH CARE EDUCATION/TRAINING PROGRAM

## 2023-04-13 PROCEDURE — 83735 ASSAY OF MAGNESIUM: CPT | Performed by: NURSE PRACTITIONER

## 2023-04-13 PROCEDURE — 85027 COMPLETE CBC AUTOMATED: CPT | Performed by: NURSE PRACTITIONER

## 2023-04-13 PROCEDURE — 25000003 PHARM REV CODE 250: Performed by: NURSE PRACTITIONER

## 2023-04-13 PROCEDURE — 21400001 HC TELEMETRY ROOM

## 2023-04-13 PROCEDURE — 80053 COMPREHEN METABOLIC PANEL: CPT | Performed by: NURSE PRACTITIONER

## 2023-04-13 PROCEDURE — 63600175 PHARM REV CODE 636 W HCPCS: Performed by: STUDENT IN AN ORGANIZED HEALTH CARE EDUCATION/TRAINING PROGRAM

## 2023-04-13 RX ORDER — ISOSORBIDE DINITRATE 10 MG/1
10 TABLET ORAL 3 TIMES DAILY
Status: DISCONTINUED | OUTPATIENT
Start: 2023-04-13 | End: 2023-04-14

## 2023-04-13 RX ADMIN — GABAPENTIN 300 MG: 300 CAPSULE ORAL at 08:04

## 2023-04-13 RX ADMIN — POLYETHYLENE GLYCOL 3350 17 G: 17 POWDER, FOR SOLUTION ORAL at 08:04

## 2023-04-13 RX ADMIN — ATORVASTATIN CALCIUM 80 MG: 40 TABLET, FILM COATED ORAL at 09:04

## 2023-04-13 RX ADMIN — GABAPENTIN 300 MG: 300 CAPSULE ORAL at 04:04

## 2023-04-13 RX ADMIN — CARVEDILOL 25 MG: 12.5 TABLET, FILM COATED ORAL at 04:04

## 2023-04-13 RX ADMIN — AMLODIPINE BESYLATE 10 MG: 5 TABLET ORAL at 08:04

## 2023-04-13 RX ADMIN — CARVEDILOL 25 MG: 12.5 TABLET, FILM COATED ORAL at 08:04

## 2023-04-13 RX ADMIN — HYDRALAZINE HYDROCHLORIDE 10 MG: 20 INJECTION INTRAMUSCULAR; INTRAVENOUS at 03:04

## 2023-04-13 RX ADMIN — METHOCARBAMOL TABLETS 750 MG: 750 TABLET, COATED ORAL at 04:04

## 2023-04-13 RX ADMIN — ASPIRIN 81 MG: 81 TABLET, COATED ORAL at 08:04

## 2023-04-13 RX ADMIN — EZETIMIBE 10 MG: 10 TABLET ORAL at 08:04

## 2023-04-13 RX ADMIN — ISOSORBIDE DINITRATE 10 MG: 10 TABLET ORAL at 09:04

## 2023-04-13 RX ADMIN — METHOCARBAMOL TABLETS 750 MG: 750 TABLET, COATED ORAL at 09:04

## 2023-04-13 RX ADMIN — RIVAROXABAN 20 MG: 20 TABLET, FILM COATED ORAL at 08:04

## 2023-04-13 RX ADMIN — METHOCARBAMOL TABLETS 750 MG: 750 TABLET, COATED ORAL at 08:04

## 2023-04-13 RX ADMIN — HYDRALAZINE HYDROCHLORIDE 100 MG: 50 TABLET, FILM COATED ORAL at 08:04

## 2023-04-13 RX ADMIN — HYDRALAZINE HYDROCHLORIDE 100 MG: 50 TABLET, FILM COATED ORAL at 09:04

## 2023-04-13 RX ADMIN — FOLIC ACID 1000 MCG: 1 TABLET ORAL at 08:04

## 2023-04-13 RX ADMIN — OXYCODONE HYDROCHLORIDE 15 MG: 10 TABLET ORAL at 09:04

## 2023-04-13 RX ADMIN — TAMSULOSIN HYDROCHLORIDE 0.4 MG: 0.4 CAPSULE ORAL at 08:04

## 2023-04-13 RX ADMIN — GABAPENTIN 300 MG: 300 CAPSULE ORAL at 09:04

## 2023-04-13 RX ADMIN — HYDRALAZINE HYDROCHLORIDE 100 MG: 50 TABLET, FILM COATED ORAL at 04:04

## 2023-04-13 RX ADMIN — ENALAPRILAT 1.25 MG: 2.5 INJECTION INTRAVENOUS at 06:04

## 2023-04-13 RX ADMIN — MUPIROCIN: 20 OINTMENT TOPICAL at 09:04

## 2023-04-13 RX ADMIN — OXYCODONE HYDROCHLORIDE 15 MG: 10 TABLET ORAL at 08:04

## 2023-04-13 RX ADMIN — CLOPIDOGREL BISULFATE 75 MG: 75 TABLET ORAL at 08:04

## 2023-04-13 RX ADMIN — CILOSTAZOL 50 MG: 50 TABLET ORAL at 08:04

## 2023-04-13 RX ADMIN — MUPIROCIN: 20 OINTMENT TOPICAL at 08:04

## 2023-04-13 NOTE — PROGRESS NOTES
[facility]  Cardiology  Progress Note    Patient Name: Yamil Escobedo  MRN: [id number]  Admission Date: 4/8/2023  Code Status: Full Code   Attending Provider: Matt Alonzo MD   Primary Care Physician: Jamari Gonzales MD  Principal Problem:<principal problem not specified>    Patient information was obtained from patient, past medical records, and ER records.     Subjective:   Chief Complaint: RLE Pain     HPI: This is a 62-year-old male, who is known to Dr. Martinez and Dr. Lozano, with history of CAD/CABG x4, PAD/R fem-pop bypass and multiple interventions, PAF since ablation, HTN, ICMO, HLD.  He presented to [facility] on 04/08/2023 with complaints of right leg pain that started around 4:00 a.m. the morning of arrival.  He said the pain started initially upon ambulation and progressed to 8/10.  He said the pain was constant and radiated from the thigh to the calf.  He complained of associated numbness to the right lower extremity.  He said he took his home oral morphine without relief.  Arterial NIVA revealed moderately severe arterial flow reduction consistent with an occluded femoral artery bypass and dampened collateral flow identified in the distal popliteal artery through the posterior tibial and dorsalis pedis arteries.  Venous was negative for DVT.  CTA revealed occluded right femoral vein grafts with scattered flow in the deep femoral artery, popliteal artery, posterior tibial, and peroneal arteries.  [facility] has admitted the patient for severe PAD. His pain in the leg is much better now, basically gone, but he did recently get some pain meds.      Hospital Course:  4.10.23:  NAD Noted. Vitals Stable. On Heparin Infusion. NPO for Peripheral Angiogram.  4.11.23: NAD Noted. Vitals Stable. Hypertensive. SR on Tele. Legs warm with Doppler Pulses. Left Groin Soft.   4.12.23: NAD. VSS. L Groin tPA Infusion/Ck Catheter. SR. BLE Warm with Doppler Pulses. NPO for Restudy.   4.13.23: NAD Noted.  Bilateral Lower Extremities Warm. Denies Leg Pain. SR on Tele. Hypertensive.     PMH: CAD/CABG x4, PAD/R fem-pop bypass and multiple interventions, PAF since ablation, HTN, ICMO, HLD  PSH:  Right fem-pop bypass, peripheral angiogram/multiple interventions, CABG x4 (LIMA to 2nd diagonal with sequential graft to obtuse marginal, sequential SVG from distal circumflex to right PDA), appendectomy  Family History:  Father-lung cancer; mother-HTN  Social History:  Former smoker, denies EtOH and illicit drug use    Previous Cardiac Diagnostics:   Peripheral Angiogram (4.12.23):  Assessment:   Patent femoral to anterior tibial artery graft.  Graft retrograde fills the popliteal artery/posterior tibial artery/peroneal vessel.  PT and peroneal vessel patent to the foot.  Distal AT is occluded and fills via collaterals from the peroneal artery.    CT Angiogram Right Lower Extremity (4.8.23):  Impression:  Occluded right femoral vein grafts with scattered flow in the deep femoral artery, popliteal artery, posterior tibial and peroneal arteries.    RLE Arterial NIVA (4.8.23):  The right lower extremity demonstrated moderately severe arterial flow reduction consistent with an occluded femoral artery bypass.  Dampened collateral flow identified in the distal popliteal artery through posterior tibial and dorsalis pedis arteries.    RLE Venous NIVA (4.8.23):  There was no evidence of deep or superficial vein thrombosis in the right lower extremity    Peripheral Angiogram (2.23.22):  Old occluded fem-pop graft  Occluded SFA  Fem-tib graft with 80% distal stenosis status post PTA with 6 mm balloon reduced to 20% stenosis    Review of patient's allergies indicates:   Allergen Reactions    Ciprofloxacin     Iodinated contrast media      N & V.; CONFUSION    Sulfa (sulfonamide antibiotics)        No current facility-administered medications on file prior to encounter.     Current Outpatient Medications on File Prior to Encounter    Medication Sig    amLODIPine (NORVASC) 10 MG tablet Take 10 mg by mouth once daily.    carvediloL (COREG) 25 MG tablet Take 25 mg by mouth 2 (two) times daily with meals.    cilostazoL (PLETAL) 100 MG Tab Take 50 mg by mouth 2 (two) times daily.    clopidogreL (PLAVIX) 75 mg tablet Take 75 mg by mouth once daily.    docusate sodium (COLACE) 100 MG capsule Take 100 mg by mouth once daily. As directed    ezetimibe (ZETIA) 10 mg tablet Take 10 mg by mouth once daily.    folic acid (FOLVITE) 800 MCG Tab Take 800 mcg by mouth once daily.    hydrALAZINE (APRESOLINE) 100 MG tablet Take 100 mg by mouth 3 (three) times daily.    morphine 30 mg TRer Take 1 capsule by mouth 2 (two) times a day.    rivaroxaban (XARELTO) 20 mg Tab Take 20 mg by mouth once daily.     rosuvastatin (CRESTOR) 40 MG Tab Take 40 mg by mouth every evening.    sacubitriL-valsartan (ENTRESTO) 49-51 mg per tablet Take 1 tablet by mouth 2 (two) times daily.    amitriptyline (ELAVIL) 25 MG tablet Take 25 mg by mouth 2 (two) times daily as needed.     aspirin (ECOTRIN) 81 MG EC tablet Take 81 mg by mouth once daily.    busPIRone (BUSPAR) 10 MG tablet Take 10 mg by mouth daily as needed.    diphenoxylate-atropine 2.5-0.025 mg (LOMOTIL) 2.5-0.025 mg per tablet Take 2 tablets by mouth 4 (four) times daily as needed.    esomeprazole (NEXIUM) 40 MG capsule Take 40 mg by mouth once daily.    furosemide (LASIX) 40 MG tablet Take 40 mg by mouth daily as needed.    gabapentin (NEURONTIN) 300 MG capsule Take 300 mg by mouth 3 (three) times daily.     methocarbamoL (ROBAXIN) 750 MG Tab Take 1 tablet by mouth 3 (three) times daily.    nitroGLYCERIN (NITROSTAT) 0.4 MG SL tablet Place 0.4 mg under the tongue every 5 (five) minutes as needed for Chest pain.    oxyCODONE (ROXICODONE) 15 MG Tab Take 15 mg by mouth 2 (two) times a day.     tamsulosin (FLOMAX) 0.4 mg Cap Take 1 capsule by mouth once daily.    zolpidem (AMBIEN CR) 6.25 MG CR tablet Take 6.25 mg by mouth  nightly as needed.     Review of Systems   Constitutional: Negative for fever.   Cardiovascular:  Negative for chest pain.   Respiratory:  Negative for shortness of breath.    Musculoskeletal:         Legs are Warm; Denies BLE Pain   All other systems reviewed and are negative.    Objective:     Vital Signs (Most Recent):  Temp: 98.3 °F (36.8 °C) (04/13/23 0800)  Pulse: 74 (04/13/23 1011)  Resp: (!) 21 (04/13/23 1011)  BP: 107/60 (04/13/23 1001)  SpO2: 95 % (04/13/23 1011) Vital Signs (24h Range):  Temp:  [97.8 °F (36.6 °C)-98.4 °F (36.9 °C)] 98.3 °F (36.8 °C)  Pulse:  [58-83] 74  Resp:  [12-27] 21  SpO2:  [92 %-100 %] 95 %  BP: (105-181)/() 107/60     Weight: 73.5 kg (162 lb)  Body mass index is 26.96 kg/m².    SpO2: 95 %         Intake/Output Summary (Last 24 hours) at 4/13/2023 1121  Last data filed at 4/13/2023 0400  Gross per 24 hour   Intake --   Output 1050 ml   Net -1050 ml       Lines/Drains/Airways       Drain  Duration                  Sheath 04/10/23 1132 Left anterior;proximal 2 days    Male External Urinary Catheter 04/10/23 1400 2 days              Peripheral Intravenous Line  Duration                  Peripheral IV - Single Lumen 04/12/23 0335 20 G Anterior;Left Shoulder 1 day         Peripheral IV - Single Lumen 04/12/23 0345 20 G Anterior;Proximal;Right Forearm 1 day              Ultrasonic Catheter  Duration                  Ultrasonic Catheter 1 day                  Telemetry: Sinus Rhythm    Physical Exam  Vitals reviewed.   Constitutional:       General: He is not in acute distress.     Appearance: Normal appearance. He is not ill-appearing.   HENT:      Head: Normocephalic.      Mouth/Throat:      Mouth: Mucous membranes are moist.   Eyes:      Extraocular Movements: Extraocular movements intact.   Cardiovascular:      Rate and Rhythm: Normal rate and regular rhythm.      Heart sounds: Normal heart sounds.   Pulmonary:      Effort: Pulmonary effort is normal. No respiratory distress.       Breath sounds: Normal breath sounds.   Abdominal:      Palpations: Abdomen is soft.   Musculoskeletal:         General: No swelling.      Cervical back: Neck supple.      Right lower leg: No edema.      Left lower leg: No edema.      Comments: Bilateral Legs are Warm. Skin Color is Normal for Ethnicity.   Skin:     General: Skin is warm and dry.      Comments: Bilateral Groins are soft with no evidence of bleed or hematoma. Legs are Warm.   Neurological:      General: No focal deficit present.      Mental Status: He is alert and oriented to person, place, and time.   Psychiatric:         Mood and Affect: Mood normal.         Behavior: Behavior normal.     Significant Labs: CMP   Recent Labs   Lab 04/12/23  0134 04/13/23  0237    137   K 4.7 5.3*   CO2 20* 21*   BUN 25.9* 33.3*   CREATININE 1.20* 1.47*   CALCIUM 9.3 9.0   ALBUMIN  --  3.9   BILITOT  --  0.3   ALKPHOS  --  44   AST  --  17   ALT  --  16     , CBC   Recent Labs   Lab 04/12/23 0134 04/13/23  0348   WBC 11.3 10.5   HGB 13.5* 13.7*   HCT 40.0* 41.7*    138       Significant Imaging:   Imaging Results              CTA Lower Extremity Right (Final result)  Result time 04/08/23 12:03:15      Final result by Arina Lagos MD (04/08/23 12:03:15)                   Impression:      Occluded right femoral vein grafts with scattered flow in the deep femoral artery, popliteal artery, posterior tibial and peroneal arteries.      Electronically signed by: Arina Lagos  Date:    04/08/2023  Time:    12:03               Narrative:    EXAMINATION:  CTA LOWER EXTREMITY RIGHT    CLINICAL HISTORY:  right leg pain, history of PAD, fem pop x 4;    TECHNIQUE:  Axial images were obtained through the right lower extremity with contrast in the arterial phase.    Coronal, sagittal, MIP and 3-D reconstructions obtained from the axial data set.    Radiation Dose:    Total DLP: 1302 mGy*cm    COMPARISON:  None available    FINDINGS:  There are extensive  calcifications of the distal abdominal aorta and iliac branches.  There is moderate multifocal narrowing of the bilateral internal and external iliac arteries.  Bilateral external iliac artery stents are in place.    There is no flow within the right femoral vein grafts.  Flow is seen within the deep femoral artery with moderate multifocal narrowing.  There is some reconstitution of flow within the popliteal artery which, with a stent at the tibioperoneal trunk.  Flow is seen within the posterior tibial and peroneal arteries to the level of the ankle.  There are extensive calcifications along the anterior temporal artery with minimal flow identified.    The left femoral artery graft appears to be patent.  The popliteal artery and tibioperoneal trunk are patent with calcifications.  The anterior tibial artery and peroneal artery appear patent.  The posterior tibial artery is not well opacified.                                    Assessment and Plan:   PAD (Severe Bilateral) with Occluded right femoral vein grafts with scattered flow in the deep femoral artery, popliteal artery, posterior tibial and peroneal arteries (CTA Right Leg 4.8.23)    - Patent femoral to anterior tibial artery graft (4.12.23)    - Status Post Restudy (4.11.23) and Placement of [employer] Infusion tPA Catheter    - Status Post Right Lower Extremity Revascularization with EKOS Catheter Placement & Thrombolytic Therapy (4.10.23)    - History of Multiple Interventions  CAD/CABG x 4 (LIMA to 2nd diagonal with sequential graft to obtuse marginal, sequential SVG from distal circumflex to right PDA)  PAF/Ablation- Now SR    [id number]    -On Xarelto, Resumed on 4.13.23  Hypertension (Above Goal)  Hyperlipidemia  ICMO/EF 45%  Renal Insufficiency (Improved)  Hyperkalemia (Mild)  Iodine Allergy    - Received DYE PREP on 4.10.23 (For Initial Study) & 4.11.23 (For Restudy) & 4.12.23 (For Restudy)    PLAN:  Continue Xarelto 20 Mg PO Daily. Continue  Plavix 75 Mg PO Daily.   Discontinue Pletal Re: History of CHF/ICMO EF 45%  Start Protonix for Gastric Protection  Discontinue Vasotec and hold off on resumption of Entresto given Renal Dysfunction & Hyperkalemia  Groin Precautions/Activity Restrictions Discussed  Mobilize As Able  Tranfer to Tele Floor to [facility]. Likely Discharge Home Tomorrow if remains stable.    Boubacar Brown, ELIZABETH   Cardiology  [facility]   04/13/2023     I have seen the patient, reviewed the Nurse Practitioner's note, assessment and plan. I have personally interviewed and examined the patient at bedside and agree with the findings. Medical decision making listed above were done under my guidance.  Most recent Peripheral angiogram restudy showed no need for further intervention.  Continue Xarelto and Plavix  May dc home tomorrow if remains stable and no further leg pain during ambulation

## 2023-04-13 NOTE — PROGRESS NOTES
[facility] - [facility]  Pulmonary Critical Care Note    Patient Name: Yamil Escobedo  MRN: [id number]  Admission Date: 4/8/2023  Hospital Length of Stay: 5 days  Code Status: Full Code  Attending Provider: Matt Alonzo MD  Primary Care Provider: Jamari Gonzales MD     Subjective:     HPI:   62-year-old male with a past medical history of CAD/CABG x4 in 2009, PAD status post right and left fem-pop bypass with multiple interventions, PAF status post ablation, hypertension, ICM, hyperlipidemia presents to [facility] on 04/08/2023 with complaints of severe right leg pain that started around 4:00 a.m. that morning.  Pain started initially upon ambulation and progressed to an 8/10 pain which radiated from the thigh to the calf.  Associated numbness and tingling to the right lower extremity.  Arterial NIVA revealed moderately severe arterial flow reduction consistent with an occluded femoral artery bypass and damp and collateral flow identified in the distal popliteal artery with the posterior tibial and dorsalis pedis arteries.  Follow-up CTA revealed occluded right femoral vein grafts with scattered flow in the deep femoral artery, popliteal artery, posterior tibial, peroneal arteries.  The patient was admitted and started on a heparin infusion.  Peripheral angiography was done thrombolytics given.  Ekos was initiated.  Patient currently denies no pain the right or left lower extremity.  He denies fever, chills, nausea, vomiting, diarrhea, back pain, chest pain, shortness a breath, weakness, rash and any other symptoms.    Hospital Course/Significant events:  Peripheral angiography 4/10/2023 with occluded right femoral graft   RLE Ekos 4/10/2023  EKOS removed on 4/11 and TPA infusing through groin sheath now   4/12 Spencer catheter removed in cath lab, patent femoral to anterior artery graft    24 Hour Interval History:  No acute events overnight. Catheter removed yesterday with no issues.     Past Medical  History:   Diagnosis Date    A-fib     Acid reflux     Acid reflux     Anemia     Anticoagulant long-term use     Anxiety     Cardiac arrest     Claudication     Coronary artery disease     Depression     DVT (deep venous thrombosis)     Embolus     Encounter for blood transfusion     Foot drop, right     Hemorrhoids     HLD (hyperlipidemia)     Hypertension     Insomnia     MI (myocardial infarction)     Numbness     R GREAT TOE ; PARTIAL LEFT FOOT    PAD (peripheral artery disease)     PVD (peripheral vascular disease)     Rhabdomyolysis     TIA (transient ischemic attack)     UTI (urinary tract infection)        Past Surgical History:   Procedure Laterality Date    ABLATION OF ARRHYTHMOGENIC FOCUS FOR ATRIAL FIBRILLATION      X 3    AORTOGRAPHY WITH SERIALOGRAPHY N/A 4/11/2023    Procedure: AORTOGRAM, WITH SERIALOGRAPHY;  Surgeon: Trent Shaffer MD;  Location: [facility] CATH LAB;  Service: Cardiology;  Laterality: N/A;    APPENDECTOMY      CABG X 4      CARDIAC ANGIOGRAM WITH STENTS      CARDIAC ARREST      DURING FEMORAL BYPASSES; 3 TIMES DURING CABG    EP STUDY      FEMORAL BYPASS Bilateral     TIMES 3    LASER ATHERECTOMY LEFT FEM-POP BYPASS GRAFT      PERCUTANEOUS TRANSLUMINAL ANGIOPLASTY (PTA) OF PERIPHERAL VESSEL N/A 1/14/2021    Procedure: PTA, PERIPHERAL VESSEL;  Surgeon: Melvin Martinez MD;  [facility];  Service: Cardiology;  Laterality: N/A;    PERCUTANEOUS TRANSLUMINAL ANGIOPLASTY (PTA) OF PERIPHERAL VESSEL Left 8/4/2021    Procedure: PTA, PERIPHERAL VESSEL of left SFA utilizing distal protection;  Surgeon: Melvin Martinez MD;  [facility] C[facility];  Service: Cardiology;  Laterality: Left;    PERIPHERAL ANGIOGRAPHY Right 4/10/2023    Procedure: Peripheral angiography;  Surgeon: Franky Rae Jr., MD;  Location: [facility] CATH LAB;  Service: Cardiology;  Laterality: Right;    PERIPHERAL ANGIOGRAPHY N/A 4/12/2023    Procedure: Peripheral angiography;  Surgeon: Tez Rivera MD;  Location: [facility] CATH  LAB;  Service: Cardiology;  Laterality: N/A;    THROMBOLYSIS, PERIPHERAL BLOOD VESSEL N/A 4/10/2023    Procedure: Thrombolysis-peripheral;  Surgeon: Franky Rae Jr., MD;  Location: [facility] CATH LAB;  Service: Cardiology;  Laterality: N/A;       Social History     Socioeconomic History    Marital status:    Tobacco Use    Smoking status: Former     Types: Cigarettes     Quit date:      Years since quittin.2   Substance and Sexual Activity    Alcohol use: Not Currently     Comment: QUIT A LONG TIME AGO    Drug use: Not Currently     Social Determinants of Health     Social Connections: Unknown    Marital Status:            Current Outpatient Medications   Medication Instructions    amitriptyline (ELAVIL) 25 mg, Oral, 2 times daily PRN    amLODIPine (NORVASC) 10 mg, Oral, Daily    aspirin (ECOTRIN) 81 mg, Oral, Daily    busPIRone (BUSPAR) 10 mg, Oral, Daily PRN    carvediloL (COREG) 25 mg, Oral, 2 times daily with meals    cilostazoL (PLETAL) 50 mg, Oral, 2 times daily    clopidogreL (PLAVIX) 75 mg, Oral, Daily    diphenoxylate-atropine 2.5-0.025 mg (LOMOTIL) 2.5-0.025 mg per tablet 2 tablets, Oral, 4 times daily PRN    docusate sodium (COLACE) 100 mg, Oral, Daily, As directed    esomeprazole (NEXIUM) 40 mg, Oral, Daily    ezetimibe (ZETIA) 10 mg, Oral, Daily    folic acid (FOLVITE) 800 mcg, Oral, Daily    furosemide (LASIX) 40 mg, Oral, Daily PRN    gabapentin (NEURONTIN) 300 mg, Oral, 3 times daily    hydrALAZINE (APRESOLINE) 100 mg, Oral, 3 times daily    methocarbamoL (ROBAXIN) 750 MG Tab 1 tablet, Oral, 3 times daily    morphine 30 mg TRer 1 capsule, Oral, 2 times daily    nitroGLYCERIN (NITROSTAT) 0.4 mg, Sublingual, Every 5 min PRN    oxyCODONE (ROXICODONE) 15 mg, Oral, 2 times daily    rosuvastatin (CRESTOR) 40 mg, Oral, Nightly    sacubitriL-valsartan (ENTRESTO) 49-51 mg per tablet 1 tablet, Oral, 2 times daily    tamsulosin (FLOMAX) 0.4 mg Cap 1 capsule, Oral, Daily    XARELTO 20 mg,  Oral, Daily    zolpidem (AMBIEN CR) 6.25 mg, Oral, Nightly PRN       Current Inpatient Medications   amLODIPine  10 mg Oral Daily    aspirin  81 mg Oral Daily    atorvastatin  80 mg Oral QHS    carvediloL  25 mg Oral BID WM    cilostazoL  50 mg Oral BID    clopidogreL  75 mg Oral Daily    enalaprilat  1.25 mg Intravenous Q6H    ezetimibe  10 mg Oral Daily    folic acid  1,000 mcg Oral Daily    gabapentin  300 mg Oral TID    hydrALAZINE  100 mg Oral TID    methocarbamoL  750 mg Oral TID    mupirocin   Nasal BID    oxyCODONE  15 mg Oral BID    polyethylene glycol  17 g Oral Daily    rivaroxaban  20 mg Oral Daily    tamsulosin  1 capsule Oral Daily       Current Intravenous Infusions          Review of Systems   Constitutional:  Negative for chills and fever.   HENT:  Negative for congestion.    Eyes:  Negative for redness.   Respiratory:  Negative for shortness of breath.    Cardiovascular:  Negative for chest pain and claudication.   Gastrointestinal:  Negative for abdominal pain, diarrhea, nausea and vomiting.   Genitourinary:  Negative for dysuria, hematuria and urgency.   Musculoskeletal:  Negative for back pain and neck pain.   Neurological:  Negative for dizziness, tingling, focal weakness, weakness and headaches.   Endo/Heme/Allergies:  Does not bruise/bleed easily.        Objective:       Intake/Output Summary (Last 24 hours) at 4/13/2023 0931  Last data filed at 4/13/2023 0400  Gross per 24 hour   Intake --   Output 1050 ml   Net -1050 ml           Vital Signs (Most Recent):  Temp: 98.4 °F (36.9 °C) (04/13/23 0400)  Pulse: 71 (04/13/23 0926)  Resp: 15 (04/13/23 0926)  BP: (!) 154/85 (04/13/23 0901)  SpO2: 97 % (04/13/23 0926)  Body mass index is 26.96 kg/m².  Weight: 73.5 kg (162 lb) Vital Signs (24h Range):  Temp:  [97.8 °F (36.6 °C)-98.4 °F (36.9 °C)] 98.4 °F (36.9 °C)  Pulse:  [58-83] 71  Resp:  [12-27] 15  SpO2:  [92 %-100 %] 97 %  BP: (105-181)/() 154/85     Physical Exam  Vitals and nursing note  reviewed.   Constitutional:       General: He is not in acute distress.     Appearance: Normal appearance. He is not ill-appearing.   HENT:      Head: Normocephalic and atraumatic.      Right Ear: Tympanic membrane normal.      Left Ear: Tympanic membrane normal.      Nose: Nose normal.      Mouth/Throat:      Mouth: Mucous membranes are moist.      Pharynx: Oropharynx is clear.   Eyes:      Extraocular Movements: Extraocular movements intact.      Pupils: Pupils are equal, round, and reactive to light.   Cardiovascular:      Rate and Rhythm: Normal rate and regular rhythm.      Pulses: Normal pulses.      Heart sounds: Normal heart sounds. No murmur heard.    No friction rub.      Comments: 2+ dorsalis pedis pulses on RLE and 1+ on LLE(audible through doppler)  )  Pulmonary:      Effort: Pulmonary effort is normal.      Breath sounds: Normal breath sounds.   Abdominal:      General: Abdomen is flat. Bowel sounds are normal. There is no distension.      Palpations: Abdomen is soft. There is no mass.      Tenderness: There is no abdominal tenderness. There is no guarding or rebound.      Hernia: No hernia is present.   Musculoskeletal:         General: No swelling, tenderness, deformity or signs of injury. Normal range of motion.      Cervical back: Normal range of motion and neck supple.      Right lower leg: No edema.      Left lower leg: No edema.      Comments: Bilateral lower extremities warm to touch.    Skin:     General: Skin is warm.      Findings: No bruising, erythema or rash.   Neurological:      General: No focal deficit present.      Mental Status: He is alert and oriented to person, place, and time. Mental status is at baseline.         Lines/Drains/Airways       Drain  Duration                  Sheath 04/10/23 1132 Left anterior;proximal 2 days    Male External Urinary Catheter 04/10/23 1400 2 days              Peripheral Intravenous Line  Duration                  Peripheral IV - Single Lumen  04/12/23 0335 20 G Anterior;Left Shoulder 1 day         Peripheral IV - Single Lumen 04/12/23 0345 20 G Anterior;Proximal;Right Forearm 1 day              Ultrasonic Catheter  Duration                  Ultrasonic Catheter 1 day                    Significant Labs:    Lab Results   Component Value Date    WBC 10.5 04/13/2023    HGB 13.7 (L) 04/13/2023    HCT 41.7 (L) 04/13/2023    MCV 94.1 (H) 04/13/2023     04/13/2023         BMP  Lab Results   Component Value Date     04/13/2023    K 5.3 (H) 04/13/2023    CHLORIDE 107 04/13/2023    CO2 21 (L) 04/13/2023    BUN 33.3 (H) 04/13/2023    CREATININE 1.47 (H) 04/13/2023    CALCIUM 9.0 04/13/2023    AGAP 9.0 04/12/2023    ESTGFRAFRICA >60 01/15/2021    EGFRNONAA >60 01/15/2021       ABG  No results for input(s): PH, PO2, PCO2, HCO3, BE in the last 168 hours.         Significant Imaging:  I have reviewed the pertinent imaging within the past 24 hours.      Assessment/Plan:     Assessment  Fem-pop graft occlusion to the right lower extremity s/p EKOS  Coronary artery disease s/p previous CABG  PAF previously on xarelto   Hypertension and hyperlipidemia      Plan  Resuming oral anticoagulation today  Downgrade when OK with cardiology        32 minutes of critical care was time spent personally by me on the following activities: development of treatment plan with patient or surrogate and bedside caregivers, discussions with consultants, evaluation of patient's response to treatment, examination of patient, ordering and performing treatments and interventions, ordering and review of laboratory studies, ordering and review of radiographic studies, pulse oximetry, re-evaluation of patient's condition.  This critical care time did not overlap with that of any other provider or involve time for any procedures.     Yola Alaniz, ELIZABETH  Pulmonary Critical Care Medicine  [facility] - [facility]

## 2023-04-14 VITALS
HEART RATE: 65 BPM | WEIGHT: 162 LBS | RESPIRATION RATE: 17 BRPM | BODY MASS INDEX: 26.99 KG/M2 | OXYGEN SATURATION: 98 % | TEMPERATURE: 98 F | DIASTOLIC BLOOD PRESSURE: 75 MMHG | HEIGHT: 65 IN | SYSTOLIC BLOOD PRESSURE: 120 MMHG

## 2023-04-14 PROBLEM — I73.9 PAD (PERIPHERAL ARTERY DISEASE): Status: RESOLVED | Noted: 2020-12-10 | Resolved: 2023-04-14

## 2023-04-14 PROBLEM — I70.219 ATHEROSCLEROSIS OF NATIVE ARTERIES OF EXTREMITY WITH INTERMITTENT CLAUDICATION: Status: RESOLVED | Noted: 2021-01-14 | Resolved: 2023-04-14

## 2023-04-14 PROBLEM — I73.9 PAD (PERIPHERAL ARTERY DISEASE): Status: ACTIVE | Noted: 2023-04-14

## 2023-04-14 LAB
ALBUMIN SERPL-MCNC: 3.6 G/DL (ref 3.4–4.8)
ALBUMIN/GLOB SERPL: 1.5 RATIO (ref 1.1–2)
ALP SERPL-CCNC: 40 UNIT/L (ref 40–150)
ALT SERPL-CCNC: 22 UNIT/L (ref 0–55)
AST SERPL-CCNC: 24 UNIT/L (ref 5–34)
BASOPHILS # BLD AUTO: 0.01 X10(3)/MCL (ref 0–0.2)
BASOPHILS NFR BLD AUTO: 0.1 %
BILIRUBIN DIRECT+TOT PNL SERPL-MCNC: 0.4 MG/DL
BUN SERPL-MCNC: 29.6 MG/DL (ref 8.4–25.7)
CALCIUM SERPL-MCNC: 8.5 MG/DL (ref 8.8–10)
CHLORIDE SERPL-SCNC: 109 MMOL/L (ref 98–107)
CO2 SERPL-SCNC: 22 MMOL/L (ref 23–31)
CREAT SERPL-MCNC: 1.33 MG/DL (ref 0.73–1.18)
EOSINOPHIL # BLD AUTO: 0.11 X10(3)/MCL (ref 0–0.9)
EOSINOPHIL NFR BLD AUTO: 1.3 %
ERYTHROCYTE [DISTWIDTH] IN BLOOD BY AUTOMATED COUNT: 12 % (ref 11.5–17)
GFR SERPLBLD CREATININE-BSD FMLA CKD-EPI: 60 MLS/MIN/1.73/M2
GLOBULIN SER-MCNC: 2.4 GM/DL (ref 2.4–3.5)
GLUCOSE SERPL-MCNC: 122 MG/DL (ref 82–115)
HCT VFR BLD AUTO: 35.3 % (ref 42–52)
HGB BLD-MCNC: 12.3 G/DL (ref 14–18)
IMM GRANULOCYTES # BLD AUTO: 0.05 X10(3)/MCL (ref 0–0.04)
IMM GRANULOCYTES NFR BLD AUTO: 0.6 %
LYMPHOCYTES # BLD AUTO: 1.9 X10(3)/MCL (ref 0.6–4.6)
LYMPHOCYTES NFR BLD AUTO: 21.8 %
MAGNESIUM SERPL-MCNC: 2.3 MG/DL (ref 1.6–2.6)
MCH RBC QN AUTO: 31.9 PG (ref 27–31)
MCHC RBC AUTO-ENTMCNC: 34.8 G/DL (ref 33–36)
MCV RBC AUTO: 91.5 FL (ref 80–94)
MONOCYTES # BLD AUTO: 1.04 X10(3)/MCL (ref 0.1–1.3)
MONOCYTES NFR BLD AUTO: 11.9 %
NEUTROPHILS # BLD AUTO: 5.6 X10(3)/MCL (ref 2.1–9.2)
NEUTROPHILS NFR BLD AUTO: 64.3 %
NRBC BLD AUTO-RTO: 0 %
PLATELET # BLD AUTO: 132 X10(3)/MCL (ref 130–400)
PMV BLD AUTO: 10.1 FL (ref 7.4–10.4)
POTASSIUM SERPL-SCNC: 4.7 MMOL/L (ref 3.5–5.1)
PROT SERPL-MCNC: 6 GM/DL (ref 5.8–7.6)
RBC # BLD AUTO: 3.86 X10(6)/MCL (ref 4.7–6.1)
SODIUM SERPL-SCNC: 138 MMOL/L (ref 136–145)
WBC # SPEC AUTO: 8.7 X10(3)/MCL (ref 4.5–11.5)

## 2023-04-14 PROCEDURE — 25000003 PHARM REV CODE 250: Performed by: STUDENT IN AN ORGANIZED HEALTH CARE EDUCATION/TRAINING PROGRAM

## 2023-04-14 PROCEDURE — 85025 COMPLETE CBC W/AUTO DIFF WBC: CPT | Performed by: NURSE PRACTITIONER

## 2023-04-14 PROCEDURE — 25000003 PHARM REV CODE 250: Performed by: NURSE PRACTITIONER

## 2023-04-14 PROCEDURE — 80053 COMPREHEN METABOLIC PANEL: CPT | Performed by: NURSE PRACTITIONER

## 2023-04-14 PROCEDURE — 25000003 PHARM REV CODE 250: Performed by: INTERNAL MEDICINE

## 2023-04-14 PROCEDURE — 83735 ASSAY OF MAGNESIUM: CPT | Performed by: NURSE PRACTITIONER

## 2023-04-14 RX ORDER — CILOSTAZOL 100 MG/1
100 TABLET ORAL 2 TIMES DAILY
Qty: 60 TABLET | Refills: 11 | Status: SHIPPED | OUTPATIENT
Start: 2023-04-14 | End: 2024-04-13

## 2023-04-14 RX ORDER — CILOSTAZOL 50 MG/1
100 TABLET ORAL 2 TIMES DAILY
Status: DISCONTINUED | OUTPATIENT
Start: 2023-04-14 | End: 2023-04-14 | Stop reason: HOSPADM

## 2023-04-14 RX ORDER — CLONIDINE HYDROCHLORIDE 0.1 MG/1
0.1 TABLET ORAL
Qty: 30 TABLET | Refills: 1 | Status: SHIPPED | OUTPATIENT
Start: 2023-04-14 | End: 2024-04-13

## 2023-04-14 RX ORDER — NIFEDIPINE 90 MG/1
90 TABLET, EXTENDED RELEASE ORAL DAILY
Qty: 90 TABLET | Refills: 3 | Status: SHIPPED | OUTPATIENT
Start: 2023-04-14 | End: 2024-04-13

## 2023-04-14 RX ADMIN — CARVEDILOL 25 MG: 12.5 TABLET, FILM COATED ORAL at 08:04

## 2023-04-14 RX ADMIN — FOLIC ACID 1000 MCG: 1 TABLET ORAL at 08:04

## 2023-04-14 RX ADMIN — OXYCODONE HYDROCHLORIDE 15 MG: 10 TABLET ORAL at 08:04

## 2023-04-14 RX ADMIN — ISOSORBIDE DINITRATE 10 MG: 10 TABLET ORAL at 08:04

## 2023-04-14 RX ADMIN — TAMSULOSIN HYDROCHLORIDE 0.4 MG: 0.4 CAPSULE ORAL at 08:04

## 2023-04-14 RX ADMIN — METHOCARBAMOL TABLETS 750 MG: 750 TABLET, COATED ORAL at 08:04

## 2023-04-14 RX ADMIN — MUPIROCIN: 20 OINTMENT TOPICAL at 08:04

## 2023-04-14 RX ADMIN — CLOPIDOGREL BISULFATE 75 MG: 75 TABLET ORAL at 08:04

## 2023-04-14 RX ADMIN — AMLODIPINE BESYLATE 10 MG: 5 TABLET ORAL at 08:04

## 2023-04-14 RX ADMIN — HYDRALAZINE HYDROCHLORIDE 100 MG: 50 TABLET, FILM COATED ORAL at 08:04

## 2023-04-14 RX ADMIN — EZETIMIBE 10 MG: 10 TABLET ORAL at 08:04

## 2023-04-14 RX ADMIN — GABAPENTIN 300 MG: 300 CAPSULE ORAL at 08:04

## 2023-04-14 RX ADMIN — RIVAROXABAN 20 MG: 20 TABLET, FILM COATED ORAL at 08:04

## 2023-04-14 RX ADMIN — CILOSTAZOL 100 MG: 50 TABLET ORAL at 12:04

## 2023-04-14 NOTE — DISCHARGE SUMMARY
List so if she I have seen the patient, reviewed the Nurse Practitioner's note, assessment and plan. I have personally interviewed and examined the patient at bedside and agree with the findings. Medical decision making listed above were done under my guidance.  [facility] - [facility] ICU  Cardiology  Discharge Summary      Patient Name: Yamil Escobedo  MRN: [id number]  Admission Date: 4/8/2023  Hospital Length of Stay: 6 days  Discharge Date and Time:  04/14/2023 9:59 AM  Attending Physician: Matt Alonzo MD    Discharging Provider: ELIZABETH Rosas  Primary Care Physician: Jamari Gonzales MD    HPI:   Chief Complaint: RLE Pain      HPI: This is a 62-year-old male, who is known to Dr. Martinez and Dr. Lozano, with history of CAD/CABG x4, PAD/R fem-pop bypass and multiple interventions, PAF since ablation, HTN, ICMO, HLD.  He presented to [facility] on 04/08/2023 with complaints of right leg pain that started around 4:00 a.m. the morning of arrival.  He said the pain started initially upon ambulation and progressed to 8/10.  He said the pain was constant and radiated from the thigh to the calf.  He complained of associated numbness to the right lower extremity.  He said he took his home oral morphine without relief.  Arterial NIVA revealed moderately severe arterial flow reduction consistent with an occluded femoral artery bypass and dampened collateral flow identified in the distal popliteal artery through the posterior tibial and dorsalis pedis arteries.  Venous was negative for DVT.  CTA revealed occluded right femoral vein grafts with scattered flow in the deep femoral artery, popliteal artery, posterior tibial, and peroneal arteries.  [facility] has admitted the patient for severe PAD. His pain in the leg is much better now, basically gone, but he did recently get some pain meds.       Hospital Course:  4.10.23:  NAD Noted. Vitals Stable. On Heparin Infusion. NPO for Peripheral Angiogram.  4.11.23: NAD  Noted. Vitals Stable. Hypertensive. SR on Tele. Legs warm with Doppler Pulses. Left Groin Soft.   4.12.23: NAD. VSS. L Groin tPA Infusion/Ck Catheter. SR. BLE Warm with Doppler Pulses. NPO for Restudy.   4.13.23: NAD Noted. Bilateral Lower Extremities Warm. Denies Leg Pain. SR on Tele. Hypertensive.   4.14.23: NAD Noted. Vitals Stable. SR on Tele. BP Above Goal. Will make Antihypertensive adjustments. DC Home Today.     PMH: CAD/CABG x4, PAD/R fem-pop bypass and multiple interventions, PAF since ablation, HTN, ICMO, HLD  PSH:  Right fem-pop bypass, peripheral angiogram/multiple interventions, CABG x4 (LIMA to 2nd diagonal with sequential graft to obtuse marginal, sequential SVG from distal circumflex to right PDA), appendectomy  Family History:  Father-lung cancer; mother-HTN  Social History:  Former smoker, denies EtOH and illicit drug use     Previous Cardiac Diagnostics:   Peripheral Angiogram (4.12.23):  Assessment:   Patent femoral to anterior tibial artery graft.  Graft retrograde fills the popliteal artery/posterior tibial artery/peroneal vessel.  PT and peroneal vessel patent to the foot.  Distal AT is occluded and fills via collaterals from the peroneal artery.     CT Angiogram Right Lower Extremity (4.8.23):  Impression:  Occluded right femoral vein grafts with scattered flow in the deep femoral artery, popliteal artery, posterior tibial and peroneal arteries.     RLE Arterial NIVA (4.8.23):  The right lower extremity demonstrated moderately severe arterial flow reduction consistent with an occluded femoral artery bypass.  Dampened collateral flow identified in the distal popliteal artery through posterior tibial and dorsalis pedis arteries.     RLE Venous NIVA (4.8.23):  There was no evidence of deep or superficial vein thrombosis in the right lower extremity     Peripheral Angiogram (2.23.22):  Old occluded fem-pop graft  Occluded SFA  Fem-tib graft with 80% distal stenosis status post PTA with 6  mm balloon reduced to 20% stenosis    Review of Systems   Respiratory:  Negative for shortness of breath.    Cardiovascular:  Negative for chest pain.   Musculoskeletal:         No Leg Pain or Numbness.   Neurological:  Positive for dizziness.   All other systems reviewed and are negative.  Indwelling Lines/Drains at time of discharge:  Lines/Drains/Airways       Drain  Duration                  Sheath 04/10/23 1132 Left anterior;proximal 3 days                    Hospital Course: Please see Above  No notes on file    Goals of Care Treatment Preferences:  Code Status: Full Code    Telemetry: Sinus Rhythm    Physical Exam   Constitutional: He is oriented to person, place, and time. normal appearance.   HENT:   Head: Normocephalic.   Mouth/Throat: Mucous membranes are moist. Oropharynx is clear.   Cardiovascular: Normal rate, regular rhythm, normal heart sounds and normal pulses.   Sinus Rhythm Pulmonary:      Effort: Pulmonary effort is normal. No respiratory distress.      Breath sounds: Normal breath sounds. No wheezing or rales.     Abdominal: Soft. Normal appearance. He exhibits no distension. There is no abdominal tenderness. There is no guarding.   Musculoskeletal:         General: Normal range of motion.      Cervical back: Neck supple.      Right lower leg: No edema.      Left lower leg: No edema.      Comments: Legs are Warm/Skin Color Normal Ethnicity/Sensation Intact.    Neurological: He is alert and oriented to person, place, and time.   Skin: Skin is warm and dry.   DP/PT Pulses intact via doppler. Bilateral groins are soft with no evidence of hematoma noted.    Psychiatric: His behavior is normal. Mood and judgment normal.   Nursing note and vitals reviewed.    Significant Diagnostic Studies: Labs:   CMP   Recent Labs   Lab 04/13/23  0237 04/14/23  0230    138   K 5.3* 4.7   CO2 21* 22*   BUN 33.3* 29.6*   CREATININE 1.47* 1.33*   CALCIUM 9.0 8.5*   ALBUMIN 3.9 3.6   BILITOT 0.3 0.4   ALKPHOS 44  40   AST 17 24   ALT 16 22   , CBC   Recent Labs   Lab 04/13/23  0348 04/14/23  0230   WBC 10.5 8.7   HGB 13.7* 12.3*   HCT 41.7* 35.3*    132    and All labs within the past 24 hours have been reviewed    Pending Diagnostic Studies:       None            Final Active Diagnoses:    Diagnosis Date Noted POA    PRINCIPAL PROBLEM:  PAD (peripheral artery disease) [I73.9] 04/14/2023 Yes      Problems Resolved During this Admission:     No new Assessment & Plan notes have been filed under this hospital service since the last note was generated.  Service: Cardiology    Radiographic Imaging:  CT Head Without Contrast (4.14.23):  FINDINGS:  There is no intracranial mass or lesion seen.  No hemorrhage is seen.  No acute infarct is seen.  There is some cerebral atrophy seen. There is some compensatory ventricular dilatation and periventricular white matter changes consistent with the patient's age. Calvarium is intact.  The posterior fossa is unremarkable..  The visualized portions of the paranasal sinuses show no acute abnormality.  Impression:  Chronic age-related changes.  No acute process.    Assessment and Plan:   PAD (Severe Bilateral) with Occluded right femoral vein grafts with scattered flow in the deep femoral artery, popliteal artery, posterior tibial and peroneal arteries (CTA Right Leg 4.8.23)    - Patent femoral to anterior tibial artery graft (4.12.23)    - Status Post Restudy (4.11.23) and Placement of [place] Ck Infusion tPA Catheter    - Status Post Right Lower Extremity Revascularization with EKOS Catheter Placement & Thrombolytic Therapy (4.10.23)    - History of Multiple Interventions  CAD/CABG x 4 (LIMA to 2nd diagonal with sequential graft to obtuse marginal, sequential SVG from distal circumflex to right PDA)  PAF/Ablation- Now SR    -[id number] 2    -On Xarelto, Resumed on 4.13.23  Hypertension (Above Goal)    - Antihypertensives Further Adjusted   Hyperlipidemia    - On Statin  ICMO/EF  45%  Renal Insufficiency (Improved)- Stable  Hyperkalemia Resolved (Now off Entresto)  Iodine Allergy    - Received DYE PREP on 4.10.23 (For Initial Study) & 4.11.23 (For Restudy) & 4.12.23 (For Restudy)     PLAN:  Continue Xarelto 20 Mg PO Daily. Continue Plavix 75 Mg PO Daily.   Resume Prior dose of Pletal 100 Mg PO BID (Per Dr. Ha)  Discontinue Norvasc. Transition to Procardia XL 90 Mg Daily. Will also order PRN Oral Clonidine for SBP > 170.  Groin Precautions/Activity Restrictions Discussed  Discharge Home Today  Check CBC/CMP in 5 days (will draw at [facility] as patient lives in [place])  Follow up with Dr. Juan YANEZ Post Discharge  Will discontinued Isosorbide due to Dizziness/Headache. Patient is Neurologically Intact & otherwise feeling well.  Antihypertensives (New Regimen): Procardia XL 90 Mg Daily, Hydralazine 100 Mg PO TID, Coreg 25 Mg PO BID, Clonidine 0.1 Mg PO BID PRN  Enstresto has been discontinued due to Recurrent Hyperkalemia in the Setting of Renal Insufficiency.    Discharged Condition: good    Disposition: Home or Self Care    Follow Up:   Follow-up Information       Melvin Martinez MD Follow up in 7 day(s).    Specialty: Cardiology  Why: Hospital Follow Up- Lab Draw CBC/CMP (NonFasting) in 5 days at [facility].  Contact information:  [address] [place] [address] [id number] [phone]                           Patient Instructions:      Diet Cardiac     Lifting restrictions   Order Comments: 1.) Advance Activity Slowly and Avoid Heavy Lifting & Groin Straining Activities for 5 days.  2.) Keep Groin Sites Clean/Dry/Open to Air. Avoid Hot Water Exposure to Groin Regions.     Activity as tolerated     Medications:  Reconciled Home Medications:      Medication List        START taking these medications      cloNIDine 0.1 MG tablet  Commonly known as: CATAPRES  Take 1 tablet (0.1 mg total) by mouth every 2 (two) hours as needed (For SBP > 170, Hold if HR < 50.).      NIFEdipine 90 MG (OSM) 24 hr tablet  Commonly known as: PROCARDIA-XL  Take 1 tablet (90 mg total) by mouth once daily.            CHANGE how you take these medications      cilostazoL 100 MG Tab  Commonly known as: PLETAL  Take 1 tablet (100 mg total) by mouth 2 (two) times daily.  What changed: how much to take            CONTINUE taking these medications      amitriptyline 25 MG tablet  Commonly known as: ELAVIL  Take 25 mg by mouth 2 (two) times daily as needed.     busPIRone 10 MG tablet  Commonly known as: BUSPAR  Take 10 mg by mouth daily as needed.     carvediloL 25 MG tablet  Commonly known as: COREG  Take 25 mg by mouth 2 (two) times daily with meals.     clopidogreL 75 mg tablet  Commonly known as: PLAVIX  Take 75 mg by mouth once daily.     diphenoxylate-atropine 2.5-0.025 mg 2.5-0.025 mg per tablet  Commonly known as: LOMOTIL  Take 2 tablets by mouth 4 (four) times daily as needed.     docusate sodium 100 MG capsule  Commonly known as: COLACE  Take 100 mg by mouth once daily. As directed     esomeprazole 40 MG capsule  Commonly known as: NEXIUM  Take 40 mg by mouth once daily.     ezetimibe 10 mg tablet  Commonly known as: ZETIA  Take 10 mg by mouth once daily.     folic acid 800 MCG Tab  Commonly known as: FOLVITE  Take 800 mcg by mouth once daily.     furosemide 40 MG tablet  Commonly known as: LASIX  Take 40 mg by mouth daily as needed.     gabapentin 300 MG capsule  Commonly known as: NEURONTIN  Take 300 mg by mouth 3 (three) times daily.     hydrALAZINE 100 MG tablet  Commonly known as: APRESOLINE  Take 100 mg by mouth 3 (three) times daily.     methocarbamoL 750 MG Tab  Commonly known as: ROBAXIN  Take 1 tablet by mouth 3 (three) times daily.     morphine 30 mg Trer  Take 1 capsule by mouth 2 (two) times a day.     nitroGLYCERIN 0.4 MG SL tablet  Commonly known as: NITROSTAT  Place 0.4 mg under the tongue every 5 (five) minutes as needed for Chest pain.     oxyCODONE 15 MG Tab  Commonly known as:  ROXICODONE  Take 15 mg by mouth 2 (two) times a day.     rosuvastatin 40 MG Tab  Commonly known as: CRESTOR  Take 40 mg by mouth every evening.     tamsulosin 0.4 mg Cap  Commonly known as: FLOMAX  Take 1 capsule by mouth once daily.     XARELTO 20 mg Tab  Generic drug: rivaroxaban  Take 20 mg by mouth once daily.     zolpidem 6.25 MG CR tablet  Commonly known as: AMBIEN CR  Take 6.25 mg by mouth nightly as needed.            STOP taking these medications      amLODIPine 10 MG tablet  Commonly known as: NORVASC     aspirin 81 MG EC tablet  Commonly known as: ECOTRIN     sacubitriL-valsartan 49-51 mg per tablet  Commonly known as: ENTRESTO              Time spent on the discharge of patient: 31 Minutes    ELIZABETH Rosas  Cardiology  [facility] - [facility] ICU    I have seen the patient, reviewed the Nurse Practitioner's note, assessment and plan. I have personally interviewed and examined the patient at bedside and agree with the findings. Medical decision making listed above were done under my guidance.  Patient was c/o headache this AM.  CT head negative.  Probably headache related to HTN and ISMN.  Will adjust BP medical regimen.  Dc home today

## 2023-04-17 ENCOUNTER — LAB VISIT (OUTPATIENT)
Dept: LAB | Facility: HOSPITAL | Age: 63
End: 2023-04-17
Attending: NURSE PRACTITIONER
Payer: MEDICARE

## 2023-04-17 DIAGNOSIS — I10 HYPERTENSION, UNSPECIFIED TYPE: Primary | ICD-10-CM

## 2023-04-17 DIAGNOSIS — I25.10 CORONARY ARTERY DISEASE, UNSPECIFIED VESSEL OR LESION TYPE, UNSPECIFIED WHETHER ANGINA PRESENT, UNSPECIFIED WHETHER NATIVE OR TRANSPLANTED HEART: ICD-10-CM

## 2023-04-17 LAB
ALBUMIN SERPL-MCNC: 3.9 G/DL (ref 3.4–4.8)
ALBUMIN/GLOB SERPL: 1.4 RATIO (ref 1.1–2)
ALP SERPL-CCNC: 52 UNIT/L (ref 40–150)
ALT SERPL-CCNC: 25 UNIT/L (ref 0–55)
AST SERPL-CCNC: 20 UNIT/L (ref 5–34)
BILIRUBIN DIRECT+TOT PNL SERPL-MCNC: 0.4 MG/DL
BUN SERPL-MCNC: 18.8 MG/DL (ref 8.4–25.7)
CALCIUM SERPL-MCNC: 9.3 MG/DL (ref 8.8–10)
CHLORIDE SERPL-SCNC: 109 MMOL/L (ref 98–107)
CO2 SERPL-SCNC: 20 MMOL/L (ref 23–31)
CREAT SERPL-MCNC: 1.14 MG/DL (ref 0.73–1.18)
ERYTHROCYTE [DISTWIDTH] IN BLOOD BY AUTOMATED COUNT: 11.7 % (ref 11.5–17)
GFR SERPLBLD CREATININE-BSD FMLA CKD-EPI: >60 MLS/MIN/1.73/M2
GLOBULIN SER-MCNC: 2.8 GM/DL (ref 2.4–3.5)
GLUCOSE SERPL-MCNC: 110 MG/DL (ref 82–115)
HCT VFR BLD AUTO: 36 % (ref 42–52)
HGB BLD-MCNC: 12.2 G/DL (ref 14–18)
MCH RBC QN AUTO: 31 PG (ref 27–31)
MCHC RBC AUTO-ENTMCNC: 33.9 G/DL (ref 33–36)
MCV RBC AUTO: 91.6 FL (ref 80–94)
NRBC BLD AUTO-RTO: 0 %
PLATELET # BLD AUTO: 192 X10(3)/MCL (ref 130–400)
PMV BLD AUTO: 10 FL (ref 7.4–10.4)
POTASSIUM SERPL-SCNC: 4.6 MMOL/L (ref 3.5–5.1)
PROT SERPL-MCNC: 6.7 GM/DL (ref 5.8–7.6)
RBC # BLD AUTO: 3.93 X10(6)/MCL (ref 4.7–6.1)
SODIUM SERPL-SCNC: 138 MMOL/L (ref 136–145)
WBC # SPEC AUTO: 7.9 X10(3)/MCL (ref 4.5–11.5)

## 2023-04-17 PROCEDURE — 85027 COMPLETE CBC AUTOMATED: CPT

## 2023-04-17 PROCEDURE — 36415 COLL VENOUS BLD VENIPUNCTURE: CPT

## 2023-04-17 PROCEDURE — 80053 COMPREHEN METABOLIC PANEL: CPT

## 2023-04-17 NOTE — PHYSICIAN QUERY
PT Name: Yamil Escobedo  MR #: [id number]     DOCUMENTATION CLARIFICATION     CDS/: Renetta Cedeño RN               Contact information: [email or web address]  This form is a permanent document in the medical record.    Query Date: April 17, 2023    By submitting this query, we are merely seeking further clarification of documentation.  Please utilize your independent clinical judgment when addressing the question(s) below.    The Medical Record contains the following:   Indicator Supporting Clinical Findings Location in Medical Record   x Kidney (Renal) Insufficiency Renal Insufficiency    Renal Insufficiency (Improved)    Renal Insufficiency (Improved)- Stable  Enstresto has been discontinued due to Recurrent Hyperkalemia in the Setting of Renal Insufficiency   Cards PN 4/10     Cards PN 4/11,4/12,4/13    DCS 4/14   x Kidney (Renal) Failure/Injury TRICIA (acute kidney injury) ED Prov Note 4/8      Nephrotoxic Agents     x BUN/Creatinine           GFR  04/08/23 10:57 04/09/23 02:45 04/10/23 09:31 04/11/23 04:21 04/12/23 01:34 04/13/23 02:37 04/14/23 02:30   BUN 19.6 19.5 20.3 22.5 25.9 (H) 33.3 (H) 29.6 (H)   Creatinine 1.21 (H) 1.21 (H) 1.33 (H) 1.12 1.20 (H) 1.47 (H) 1.33 (H)   eGFR >60 >60 60 >60 >60 53 60    Labs 4/8-4/14                  Urine: Casts         Eosinophils      Dehydration     x Nausea/Vomiting Gastrointestinal:  Negative for abdominal pain, diarrhea, nausea and vomiting. H&P 4/10      Dialysis/CRRT      Treatment      Other         [facility] approved diagnostic criteria for acute kidney injury is based on KDIGO criteria:    An increase in serum creatinine > 0.3mg/dl within 48 hours  OR  Increase in serum creatinine to > 1.5x baseline, which is known or presumed to have occurred within the prior 7 days  OR  Urine volume <0.5 ml/kg/hr for 6 hours       The clinical guidelines noted above are only a system guideline. It does not replace the providers clinical judgment.     Provider, please  specify the diagnosis or diagnoses associated with above clinical findings.     [    ] Unspecified Acute Kidney Failure/Injury       [  X  ] Other Acute Kidney Failure/Injury (please specify): __AKI__________       [    ] Acute Renal Insufficiency  - Consider if SCr rise is transient and normalizes quickly with no efforts at real resuscitation of vital signs and perfusion     [    ] Other (please specify): _______________________________           Please document in your progress notes daily for the duration of treatment until resolved and include in your discharge summary.    References:   KDIGO Clinical Practice Guideline for Acute Kidney Injury. (2012, March). Retrieved October 21, 2020, from https://kdigo.org/wp-content/uploads/2016/10/NEAPL-0344-UDM-Guideline-English.pdf    SHIMA Catalan MD, NETTA Casper MD, & LOREE Zazueta MD. (1960). Renal medullary necrosis [Abstract]. The American Journal of Medicine, 29(1), 132-156. Doi:https://www.sciencedirect.com/science/article/abs/pii/4008866531639005    LOREE Jaramillo MD, & MINNIE Merino MD, MS. (2020, June 18). Definition and staging of chronic kidney disease in adults ([id number] NETTA Gramajo MD, ScD & [id number] MELONIE Morales MD, MSc, Eds.). Retrieved October 21, 2020, from https://www.Netvibes.SpinNote/contents/definition-and-staging-of-chronic-kidney-disease-in-adults?search=ckd%20staging&source=search_result&selectedTitle=1~150&usage_type=default&display_rank=1     MARTHA Clarke MD, FACP. (2015, Janett 15). Acute kidney injury revisited. Retrieved October 21, 2020, from https://acphospitalist.org/archives/2015/06/coding-acute-kidney-injury.htm    MOISES Wiggins MD. (2019, July). Renal Cortical Necrosis. Retrieved October 21, 2020, from https://www.BTC.sx.SpinNote/professional/genitourinary-disorders/renovascular-disorders/renal-cortical-necrosis    Form No. [id number]

## 2023-04-18 ENCOUNTER — PATIENT OUTREACH (OUTPATIENT)
Dept: ADMINISTRATIVE | Facility: CLINIC | Age: 63
End: 2023-04-18
Payer: MEDICARE

## 2023-04-18 NOTE — PROGRESS NOTES
C3 nurse attempted to contact Yamil [facility]  for a TCC post hospital discharge follow up call. No answer. Left voicemail with callback information. The patient does not have a scheduled HOSFU appointment noted.

## 2023-04-19 NOTE — PROGRESS NOTES
C3 nurse attempted to contact Yamil [facility]  for a TCC post hospital discharge follow up call. No answer. Left voicemail with callback information. The patient does not have a scheduled HOSFU appointment noted.

## 2023-04-19 NOTE — PHYSICIAN QUERY
PT Name: Yamil Escobedo  MR #: [id number]     DOCUMENTATION CLARIFICATION     CDS/: Renetta CedeñoRN               Contact information: [email or web address]  This form is a permanent document in the medical record.     Query Date: April 19, 2023    By submitting this query, we are merely seeking further clarification of documentation.  Please utilize your independent clinical judgment when addressing the question(s) below.    The Medical Record contains the following     Indicator Supporting Clinical Findings Location in Medical Record   x Kidney (Renal) Insufficiency Renal Insufficiency     Renal Insufficiency (Improved)     Renal Insufficiency (Improved)- Stable  Enstresto has been discontinued due to Recurrent Hyperkalemia in the Setting of Renal Insufficiency    Cards PN 4/10      Cards PN 4/11,4/12,4/13     DCS 4/14   x Kidney (Renal) Failure/Injury TRICIA (acute kidney injury) ED Prov Note 4/8        Nephrotoxic Agents       x BUN/Creatinine           GFR   04/08/23 10:57 04/09/23 02:45 04/10/23 09:31 04/11/23 04:21 04/12/23 01:34 04/13/23 02:37 04/14/23 02:30   BUN 19.6 19.5 20.3 22.5 25.9 (H) 33.3 (H) 29.6 (H)   Creatinine 1.21 (H) 1.21 (H) 1.33 (H) 1.12 1.20 (H) 1.47 (H) 1.33 (H)   eGFR >60 >60 60 >60 >60 53 60    Labs 4/8-4/14                          Urine: Casts         Eosinophils         Dehydration       x Nausea/Vomiting Gastrointestinal:  Negative for abdominal pain, diarrhea, nausea and vomiting. H&P 4/10        Dialysis/CRRT         Treatment        x Other  [  X  ] Other Acute Kidney Failure/Injury (please specify): __AKI__________           Query response from 4/17      [facility] approved diagnostic criteria for acute kidney injury is based on KDIGO criteria:     An increase in serum creatinine > 0.3mg/dl within 48 hours  OR  Increase in serum creatinine to > 1.5x baseline, which is known or presumed to have occurred within the prior 7 days  OR  Urine volume <0.5 ml/kg/hr for 6 hours         (TRICIA) has been confirmed as a diagnosis via query signed (4/17/2023  3:17 PM).    Based on your medical judgment and in order to clinically support the documented diagnosis, please document the clinical indicators (signs, symptoms, & treatment) that were utilized to support this diagnosis:    [   ]  Signs, Symptoms, & Treatment: ________________________________________     [ ]  Above stated diagnosis is not confirmed and/or it has been ruled out     [   ]  Above stated diagnosis is not confirmed and/or it has been ruled out, other diagnosis ruled in (please          specify):_______________     [   ]  Other clarification (please specify): ___________________________________           Form No. [id number]

## 2023-04-20 NOTE — PROGRESS NOTES
C3 nurse spoke with patient's wife for a TCC post hospital discharge follow up call. The patient has a scheduled HOSFU appointment with Dr. Mlevin Martinez on 04/24/2023. Appointment with Jamari Gonzales MD on 04/28/2023.

## 2023-04-21 NOTE — PHYSICIAN QUERY
PT Name: Yamil Escobedo  MR #: [id number]     DOCUMENTATION CLARIFICATION     CDS/: Renetta CedeñoRN               Contact information: [email or web address]  This form is a permanent document in the medical record.     Query Date: April 21, 2023    By submitting this query, we are merely seeking further clarification of documentation.  Please utilize your independent clinical judgment when addressing the question(s) below.    The Medical Record contains the following     Indicator Supporting Clinical Findings Location in Medical Record   x Kidney (Renal) Insufficiency Renal Insufficiency     Renal Insufficiency (Improved)     Renal Insufficiency (Improved)- Stable  Enstresto has been discontinued due to Recurrent Hyperkalemia in the Setting of Renal Insufficiency    Cards PN 4/10      Cards PN 4/11,4/12,4/13     DCS 4/14   x Kidney (Renal) Failure/Injury TRICIA (acute kidney injury) ED Prov Note 4/8        Nephrotoxic Agents       x BUN/Creatinine           GFR   04/08/23 10:57 04/09/23 02:45 04/10/23 09:31 04/11/23 04:21 04/12/23 01:34 04/13/23 02:37 04/14/23 02:30   BUN 19.6 19.5 20.3 22.5 25.9 (H) 33.3 (H) 29.6 (H)   Creatinine 1.21 (H) 1.21 (H) 1.33 (H) 1.12 1.20 (H) 1.47 (H) 1.33 (H)   eGFR >60 >60 60 >60 >60 53 60    Labs 4/8-4/14                          Urine: Casts         Eosinophils         Dehydration       x Nausea/Vomiting Gastrointestinal:  Negative for abdominal pain, diarrhea, nausea and vomiting. H&P 4/10        Dialysis/CRRT         Treatment        x Other  [  X  ] Other Acute Kidney Failure/Injury (please specify): __AKI__________           Query response from 4/17      [facility] approved diagnostic criteria for acute kidney injury is based on KDIGO criteria:     An increase in serum creatinine > 0.3mg/dl within 48 hours  OR  Increase in serum creatinine to > 1.5x baseline, which is known or presumed to have occurred within the prior 7 days  OR  Urine volume <0.5 ml/kg/hr for 6  hours       Resending Query due to answer not being selected on previous query request      (TRICIA) has been confirmed as a diagnosis via query signed (4/17/2023  3:17 PM).    Based on your medical judgment and in order to clinically support the documented diagnosis,   please document the clinical indicators (signs, symptoms, & treatment) that were utilized to support this diagnosis:    [  x ]  Signs, Symptoms, & Treatment: ___________decrease urine output_____________________________     [ ]  Above stated diagnosis is not confirmed and/or it has been ruled out     [   ]  Above stated diagnosis is not confirmed and/or it has been ruled out, other diagnosis ruled in (please          specify):_______________     [   ]  Other clarification (please specify): ___________________________________           Form No. [id number]

## 2023-07-07 ENCOUNTER — HOSPITAL ENCOUNTER (EMERGENCY)
Facility: HOSPITAL | Age: 63
Discharge: HOME | End: 2023-07-07
Attending: EMERGENCY MEDICINE
Payer: MEDICARE

## 2023-07-07 VITALS
DIASTOLIC BLOOD PRESSURE: 80 MMHG | SYSTOLIC BLOOD PRESSURE: 140 MMHG | BODY MASS INDEX: 26.63 KG/M2 | WEIGHT: 160 LBS | TEMPERATURE: 99 F | HEART RATE: 67 BPM | RESPIRATION RATE: 20 BRPM | OXYGEN SATURATION: 98 %

## 2023-07-07 DIAGNOSIS — M25.569 KNEE PAIN: ICD-10-CM

## 2023-07-07 DIAGNOSIS — I73.9 PAD (PERIPHERAL ARTERY DISEASE): ICD-10-CM

## 2023-07-07 DIAGNOSIS — M79.606 LEG PAIN: ICD-10-CM

## 2023-07-07 DIAGNOSIS — M79.89 LEG SWELLING: ICD-10-CM

## 2023-07-07 LAB
ALBUMIN SERPL-MCNC: 4.2 G/DL (ref 3.4–4.8)
ALBUMIN/GLOB SERPL: 1.6 RATIO (ref 1.1–2)
ALP SERPL-CCNC: 56 UNIT/L (ref 40–150)
ALT SERPL-CCNC: 14 UNIT/L (ref 0–55)
APTT PPP: 36.2 SECONDS (ref 23.2–33.7)
AST SERPL-CCNC: 17 UNIT/L (ref 5–34)
BASOPHILS # BLD AUTO: 0.02 X10(3)/MCL
BASOPHILS NFR BLD AUTO: 0.3 %
BILIRUBIN DIRECT+TOT PNL SERPL-MCNC: 0.4 MG/DL
BUN SERPL-MCNC: 22.1 MG/DL (ref 8.4–25.7)
CALCIUM SERPL-MCNC: 9.9 MG/DL (ref 8.8–10)
CHLORIDE SERPL-SCNC: 111 MMOL/L (ref 98–107)
CO2 SERPL-SCNC: 22 MMOL/L (ref 23–31)
CREAT SERPL-MCNC: 1.26 MG/DL (ref 0.73–1.18)
EOSINOPHIL # BLD AUTO: 0.07 X10(3)/MCL (ref 0–0.9)
EOSINOPHIL NFR BLD AUTO: 1 %
ERYTHROCYTE [DISTWIDTH] IN BLOOD BY AUTOMATED COUNT: 12.4 % (ref 11.5–17)
GFR SERPLBLD CREATININE-BSD FMLA CKD-EPI: >60 MLS/MIN/1.73/M2
GLOBULIN SER-MCNC: 2.6 GM/DL (ref 2.4–3.5)
GLUCOSE SERPL-MCNC: 105 MG/DL (ref 82–115)
HCT VFR BLD AUTO: 36.7 % (ref 42–52)
HGB BLD-MCNC: 12.6 G/DL (ref 14–18)
IMM GRANULOCYTES # BLD AUTO: 0.02 X10(3)/MCL (ref 0–0.04)
IMM GRANULOCYTES NFR BLD AUTO: 0.3 %
INR BLD: 1.05 (ref 0–1.3)
LYMPHOCYTES # BLD AUTO: 1.73 X10(3)/MCL (ref 0.6–4.6)
LYMPHOCYTES NFR BLD AUTO: 24.9 %
MCH RBC QN AUTO: 31.5 PG (ref 27–31)
MCHC RBC AUTO-ENTMCNC: 34.3 G/DL (ref 33–36)
MCV RBC AUTO: 91.8 FL (ref 80–94)
MONOCYTES # BLD AUTO: 0.67 X10(3)/MCL (ref 0.1–1.3)
MONOCYTES NFR BLD AUTO: 9.6 %
NEUTROPHILS # BLD AUTO: 4.45 X10(3)/MCL (ref 2.1–9.2)
NEUTROPHILS NFR BLD AUTO: 63.9 %
NRBC BLD AUTO-RTO: 0 %
PLATELET # BLD AUTO: 177 X10(3)/MCL (ref 130–400)
PMV BLD AUTO: 10.5 FL (ref 7.4–10.4)
POTASSIUM SERPL-SCNC: 5 MMOL/L (ref 3.5–5.1)
PROT SERPL-MCNC: 6.8 GM/DL (ref 5.8–7.6)
PROTHROMBIN TIME: 13.6 SECONDS (ref 12.5–14.5)
RBC # BLD AUTO: 4 X10(6)/MCL (ref 4.7–6.1)
SODIUM SERPL-SCNC: 139 MMOL/L (ref 136–145)
WBC # SPEC AUTO: 6.96 X10(3)/MCL (ref 4.5–11.5)

## 2023-07-07 PROCEDURE — 99284 EMERGENCY DEPT VISIT MOD MDM: CPT | Mod: 25

## 2023-07-07 PROCEDURE — 85025 COMPLETE CBC W/AUTO DIFF WBC: CPT | Performed by: PHYSICIAN ASSISTANT

## 2023-07-07 PROCEDURE — 80053 COMPREHEN METABOLIC PANEL: CPT | Performed by: PHYSICIAN ASSISTANT

## 2023-07-07 PROCEDURE — 85730 THROMBOPLASTIN TIME PARTIAL: CPT | Performed by: PHYSICIAN ASSISTANT

## 2023-07-07 PROCEDURE — 85610 PROTHROMBIN TIME: CPT | Performed by: PHYSICIAN ASSISTANT

## 2023-07-07 NOTE — ED PROVIDER NOTES
Encounter Date: 7/7/2023       History     Chief Complaint   Patient presents with    Leg Pain     Pt presents with right leg pain, swollen knee, ankle pain, calf pain, hx of stenosis. Denies any trauma/injury to leg.  Hx of stenosis in legs. Currently on xarelto and plavix. Denies numbness/tingling. +2 DP/posterior tibialis pulses bilaterally     62 y.o.  male with a history of GERD, DVT, and CAD presents to Emergency Department with a chief complaint of atraumatic R knee pain. Symptoms began on yesterday and have been constant since onset. Associated symptoms include R knee swelling. Symptoms are aggravated with palpation and exertion and there are no alleviating factors. The patient denies CP, SOB, fever, chills, leg pain, leg swelling, or dizziness. No other reported symptoms at this time. CISS: Walker. Reports he is compliant with blood thinner admin.       The history is provided by the patient. No  was used.   Illness   The current episode started yesterday. The problem occurs continuously. The problem has been unchanged. The symptoms are aggravated by movement and activity. Pertinent negatives include no fever, no photophobia, no abdominal pain, no nausea, no vomiting, no stridor, no neck pain, no cough, no shortness of breath, no URI and no wheezing. He has received no recent medical care.   Review of patient's allergies indicates:   Allergen Reactions    Ciprofloxacin     Iodinated contrast media      N & V.; CONFUSION    Sulfa (sulfonamide antibiotics)      Past Medical History:   Diagnosis Date    A-fib     Acid reflux     Acid reflux     Anemia     Anticoagulant long-term use     Anxiety     Cardiac arrest     Claudication     Coronary artery disease     Depression     DVT (deep venous thrombosis)     Embolus     Encounter for blood transfusion     Foot drop, right     Hemorrhoids     HLD (hyperlipidemia)     Hypertension     Insomnia     MI (myocardial infarction)      Numbness     R GREAT TOE ; PARTIAL LEFT FOOT    PAD (peripheral artery disease)     PVD (peripheral vascular disease)     Rhabdomyolysis     TIA (transient ischemic attack)     UTI (urinary tract infection)      Past Surgical History:   Procedure Laterality Date    ABLATION OF ARRHYTHMOGENIC FOCUS FOR ATRIAL FIBRILLATION      X 3    AORTOGRAPHY WITH SERIALOGRAPHY N/A 4/11/2023    Procedure: AORTOGRAM, WITH SERIALOGRAPHY;  Surgeon: Trent Shaffer MD;  Location: [facility] CATH LAB;  Service: Cardiology;  Laterality: N/A;    APPENDECTOMY      CABG X 4      CARDIAC ANGIOGRAM WITH STENTS      CARDIAC ARREST      DURING FEMORAL BYPASSES; 3 TIMES DURING CABG    EP STUDY      FEMORAL BYPASS Bilateral     TIMES 3    LASER ATHERECTOMY LEFT FEM-POP BYPASS GRAFT      PERCUTANEOUS TRANSLUMINAL ANGIOPLASTY (PTA) OF PERIPHERAL VESSEL N/A 1/14/2021    Procedure: PTA, PERIPHERAL VESSEL;  Surgeon: Melvin Martinez MD;  Location: [facility] CATH;  Service: Cardiology;  Laterality: N/A;    PERCUTANEOUS TRANSLUMINAL ANGIOPLASTY (PTA) OF PERIPHERAL VESSEL Left 8/4/2021    Procedure: PTA, PERIPHERAL VESSEL of left SFA utilizing distal protection;  Surgeon: Melvin Martinez MD;  Location: [facility] CATH;  Service: Cardiology;  Laterality: Left;    PERIPHERAL ANGIOGRAPHY Right 4/10/2023    Procedure: Peripheral angiography;  Surgeon: Franky Rae Jr., MD;  Location: [facility] CATH LAB;  Service: Cardiology;  Laterality: Right;    PERIPHERAL ANGIOGRAPHY N/A 4/12/2023    Procedure: Peripheral angiography;  Surgeon: Tez Rivera MD;  Location: [facility] CATH LAB;  Service: Cardiology;  Laterality: N/A;    THROMBOLYSIS, PERIPHERAL BLOOD VESSEL N/A 4/10/2023    Procedure: Thrombolysis-peripheral;  Surgeon: Franky Rae Jr., MD;  Location: [facility] CATH LAB;  Service: Cardiology;  Laterality: N/A;     Family History   Problem Relation Age of Onset    Cancer Father         LUNG     Social History     Tobacco Use    Smoking status: Former     Types: Cigarettes      Quit date:      Years since quittin.5   Substance Use Topics    Alcohol use: Not Currently     Comment: QUIT A LONG TIME AGO    Drug use: Not Currently     Review of Systems   Constitutional:  Negative for chills, fatigue and fever.   Eyes:  Negative for photophobia and visual disturbance.   Respiratory:  Negative for cough, shortness of breath, wheezing and stridor.    Cardiovascular:  Negative for chest pain, palpitations and leg swelling.   Gastrointestinal:  Negative for abdominal pain, nausea and vomiting.   Musculoskeletal:  Positive for arthralgias and joint swelling. Negative for neck pain.   Skin:  Negative for color change and wound.   All other systems reviewed and are negative.    Physical Exam     Initial Vitals [23 1000]   BP Pulse Resp Temp SpO2   134/76 81 18 98.5 °F (36.9 °C) 98 %      MAP       --         Physical Exam    Nursing note and vitals reviewed.  Constitutional: He appears well-developed and well-nourished. He is not diaphoretic. He is cooperative.  Non-toxic appearance. No distress.   HENT:   Head: Normocephalic and atraumatic.   Right Ear: External ear normal.   Left Ear: External ear normal.   Nose: Nose normal.   Mouth/Throat: Oropharynx is clear and moist. No oropharyngeal exudate.   Eyes: Conjunctivae and EOM are normal. Pupils are equal, round, and reactive to light.   Neck: Neck supple.   Normal range of motion.  Cardiovascular:  Normal rate, regular rhythm, S1 normal, S2 normal, normal heart sounds, intact distal pulses and normal pulses.           Pulmonary/Chest: Effort normal and breath sounds normal. No respiratory distress. He has no wheezes. He exhibits no tenderness.   Abdominal: Abdomen is soft. Bowel sounds are normal. He exhibits no distension. There is no abdominal tenderness.   Musculoskeletal:         General: Tenderness present. Normal range of motion.      Cervical back: Normal range of motion and neck supple.      Right knee: Swelling present. No  erythema. Normal range of motion. Tenderness present.      Left knee: Normal.      Comments: Tenderness and mild swelling noted to R knee. Full 5/5 ROM noted. CMS intact. All other adjacent joints otherwise normal.        Neurological: He is alert and oriented to person, place, and time. He has normal strength. No sensory deficit. GCS score is 15. GCS eye subscore is 4. GCS verbal subscore is 5. GCS motor subscore is 6.   Skin: Skin is warm and dry. Capillary refill takes less than 2 seconds.   Psychiatric: He has a normal mood and affect. Thought content normal.       ED Course   Procedures  Labs Reviewed   COMPREHENSIVE METABOLIC PANEL - Abnormal; Notable for the following components:       Result Value    Chloride 111 (*)     Carbon Dioxide 22 (*)     Creatinine 1.26 (*)     All other components within normal limits   APTT - Abnormal; Notable for the following components:    PTT 36.2 (*)     All other components within normal limits   CBC WITH DIFFERENTIAL - Abnormal; Notable for the following components:    RBC 4.00 (*)     Hgb 12.6 (*)     Hct 36.7 (*)     MCH 31.5 (*)     MPV 10.5 (*)     All other components within normal limits   PROTIME-INR - Normal   CBC W/ AUTO DIFFERENTIAL    Narrative:     The following orders were created for panel order CBC auto differential.  Procedure                               Abnormality         Status                     ---------                               -----------         ------                     CBC with Differential[[id number]]        Abnormal            Final result                 Please view results for these tests on the individual orders.          Imaging Results              X-Ray Knee Complete 4 Or More Views Right (Final result)  Result time 07/07/23 12:18:42      Final result by Nicko Pérez MD (07/07/23 12:18:42)                   Impression:      No acute osseous findings      Electronically signed by: Nicko Pérez  MD  Date:    07/07/2023  Time:    12:18               Narrative:    EXAMINATION:  Four views right knee    CLINICAL HISTORY:  Pain    COMPARISON:  None    FINDINGS:  No displaced fracture or dislocation.  No joint effusion.  Vascular stents are posterior to the knee with dense native artery calcification.  There is chronic fragmentation at the tibial tuberosity.                                       Medications - No data to display  Medical Decision Making:   Initial Assessment:   Patient awake, alert, has non-labored breathing, and follows commands appropriately. C/o R knee pain and swelling that began on yesterday. Afebrile. NAD noted.   Differential Diagnosis:   DVT, Venous Insufficiency, Cellulitis, Knee Pain  Clinical Tests:   Lab Tests: Reviewed and Ordered  Radiological Study: Ordered and Reviewed  ED Management:  Co-morbidities and/or factors adding to the complexity or risk for the patient?: none  Differential diagnoses: DVT, Venous Insufficiency, Cellulitis, Knee Pain  Decision to obtain previous or outside records?: I did not review records.   Chart Review (nursing home, outside records, CareEverywhere): none  Labs/imaging/other tests obtained/considered (risk/benefits of testing discussed): cbc, cmp, pt/ptt/inr, xray r knee, US  Labs/tests intepretation: Labs unremarkable. Xray- No acute osseous findings. US- Technically difficult study due to shadowing from arterial bypasses.   There was no evidence of deep or superficial vein thrombosis in the right lower extremity. The right lower extremity demonstrated a patent fem-pop bypass and triphasic waveforms in the posterior tibial and biphasic waveforms in the dorsalis pedis arteries. A separate ankle-brachial index was performed and the right resting ankle-brachial index is .97 which is normal. Informed patient of results.   My independent imaging interpretation: none  Treatment/interventions, IV fluids, IV medications: none  Complex management (IV  controlled substances, went to OR, DNR, meds requiring monitoring, transfer, etc)?: none  Workup/treatment affected by social determinants of health?:none   Point of care US done/interpretation: none  Consults/radiologist/EMS/social work/family discussion/alternate history: none  Advanced care planning/end of life discussion: none  Shared decision making: Discussed plan of care and interventions with patient. Agreed to and aware of plan of care. Comfortable being discharged home.   ETOH/smoking/drug cessation discussion: none  Dispo: Patient discharged home. Patient denies new or additional complaints; no further tests indicated at this time. Verbalized understanding of instructions. No emergent or apparent distress noted prior to discharge. To follow up with PCP in 1 week as needed. Strict ER return precautions given.                             Clinical Impression:   Final diagnoses:  [M79.89] Leg swelling  [M79.606] Leg pain  [M25.569] Knee pain        ED Disposition Condition    Discharge Stable          ED Prescriptions    None       Follow-up Information       Follow up With Specialties Details Why Contact Info    Jamari Gonzales MD Family Medicine Call in 1 week If symptoms worsen, As needed [address] [address]  [phone]      [facility]  Emergency Dept Emergency Medicine Go to  If symptoms worsen, As needed [address] [facility] [address] [phone]    Melvin Martinez MD Cardiology Call  If symptoms worsen, As needed [address] [facility]  [facility]  [place] [address]  [phone]               Marcia Murrell NP  07/07/23 [id number]

## 2023-07-07 NOTE — FIRST PROVIDER EVALUATION
Medical screening examination initiated.  I have conducted a focused provider triage encounter, findings are as follows:    Chief Complaint   Patient presents with    Leg Pain     Pt presents with right leg pain, swollen knee, ankle pain, calf pain, hx of stenosis. Denies any trauma/injury to leg.  Hx of stenosis in legs. Currently on xarelto and plavix. Denies numbness/tingling.      Brief history of present illness:  62 y.o. male presents to the ED with worsening pain to the right leg about 2 weeks ago. Notes swelling started today. Patient has severe PAD in the legs; takes both xarelto and plavix. Denies injury or trauma. Sees [facility]    Vitals:    07/07/23 1000   BP: 134/76   Pulse: 81   Resp: 18   Temp: 98.5 °F (36.9 °C)   SpO2: 98%   Weight: 72.6 kg (160 lb)     Pertinent physical exam:  Awake, alert, ambulatory, non-labored respirations, shiny appearance of right lower extremity, swelling noted, tender     Brief workup plan:  labs, US    Preliminary workup initiated; this workup will be continued and followed by the physician or advanced practice provider that is assigned to the patient when roomed.

## 2023-07-11 LAB
LEFT ABI: 1.04
LEFT ARM BP: 134 MMHG
LEFT DORSALIS PEDIS: 140 MMHG
LEFT POSTERIOR TIBIAL: 139 MMHG
RIGHT ABI: 0.97
RIGHT DORSALIS PEDIS: 125 MMHG
RIGHT POSTERIOR TIBIAL: 130 MMHG

## 2024-05-07 ENCOUNTER — LAB VISIT (OUTPATIENT)
Dept: LAB | Facility: HOSPITAL | Age: 64
End: 2024-05-07
Attending: INTERNAL MEDICINE
Payer: MEDICARE

## 2024-05-07 DIAGNOSIS — I25.10 CORONARY ARTERY DISEASE, UNSPECIFIED VESSEL OR LESION TYPE, UNSPECIFIED WHETHER ANGINA PRESENT, UNSPECIFIED WHETHER NATIVE OR TRANSPLANTED HEART: ICD-10-CM

## 2024-05-07 DIAGNOSIS — I10 HYPERTENSION, UNSPECIFIED TYPE: Primary | ICD-10-CM

## 2024-05-07 LAB
ANION GAP SERPL CALC-SCNC: 8 MEQ/L
BUN SERPL-MCNC: 17.7 MG/DL (ref 8.4–25.7)
CALCIUM SERPL-MCNC: 9.3 MG/DL (ref 8.8–10)
CHLORIDE SERPL-SCNC: 109 MMOL/L (ref 98–107)
CO2 SERPL-SCNC: 23 MMOL/L (ref 23–31)
CREAT SERPL-MCNC: 1.22 MG/DL (ref 0.73–1.18)
CREAT/UREA NIT SERPL: 15
ERYTHROCYTE [DISTWIDTH] IN BLOOD BY AUTOMATED COUNT: 12.2 % (ref 11.5–17)
GLUCOSE SERPL-MCNC: 98 MG/DL (ref 82–115)
HCT VFR BLD AUTO: 35.1 % (ref 42–52)
HGB BLD-MCNC: 12.5 G/DL (ref 14–18)
MCH RBC QN AUTO: 31.6 PG (ref 27–31)
MCHC RBC AUTO-ENTMCNC: 35.6 G/DL (ref 33–36)
MCV RBC AUTO: 88.9 FL (ref 80–94)
NRBC BLD AUTO-RTO: 0 %
PLATELET # BLD AUTO: 148 X10(3)/MCL (ref 130–400)
PMV BLD AUTO: 10 FL (ref 7.4–10.4)
POTASSIUM SERPL-SCNC: 4.5 MMOL/L (ref 3.5–5.1)
RBC # BLD AUTO: 3.95 X10(6)/MCL (ref 4.7–6.1)
SODIUM SERPL-SCNC: 140 MMOL/L (ref 136–145)
WBC # SPEC AUTO: 5.85 X10(3)/MCL (ref 4.5–11.5)

## 2024-05-07 PROCEDURE — 80048 BASIC METABOLIC PNL TOTAL CA: CPT

## 2024-05-07 PROCEDURE — 36415 COLL VENOUS BLD VENIPUNCTURE: CPT

## 2024-05-07 PROCEDURE — 85027 COMPLETE CBC AUTOMATED: CPT

## 2025-02-18 ENCOUNTER — LAB VISIT (OUTPATIENT)
Dept: LAB | Facility: HOSPITAL | Age: 65
End: 2025-02-18
Attending: INTERNAL MEDICINE
Payer: MEDICARE

## 2025-02-18 DIAGNOSIS — I25.10 CORONARY ARTERY DISEASE, UNSPECIFIED VESSEL OR LESION TYPE, UNSPECIFIED WHETHER ANGINA PRESENT, UNSPECIFIED WHETHER NATIVE OR TRANSPLANTED HEART: ICD-10-CM

## 2025-02-18 DIAGNOSIS — I10 HYPERTENSION, UNSPECIFIED TYPE: Primary | ICD-10-CM

## 2025-02-18 LAB
ERYTHROCYTE [DISTWIDTH] IN BLOOD BY AUTOMATED COUNT: 12.7 % (ref 11.5–17)
HCT VFR BLD AUTO: 37.8 % (ref 42–52)
HGB BLD-MCNC: 12.8 G/DL (ref 14–18)
MCH RBC QN AUTO: 31.2 PG (ref 27–31)
MCHC RBC AUTO-ENTMCNC: 33.9 G/DL (ref 33–36)
MCV RBC AUTO: 92.2 FL (ref 80–94)
NRBC BLD AUTO-RTO: 0 %
PLATELET # BLD AUTO: 167 X10(3)/MCL (ref 130–400)
PMV BLD AUTO: 9.9 FL (ref 7.4–10.4)
RBC # BLD AUTO: 4.1 X10(6)/MCL (ref 4.7–6.1)
WBC # BLD AUTO: 4.93 X10(3)/MCL (ref 4.5–11.5)

## 2025-02-18 PROCEDURE — 85027 COMPLETE CBC AUTOMATED: CPT

## 2025-02-18 PROCEDURE — 36415 COLL VENOUS BLD VENIPUNCTURE: CPT

## (undated) DEVICE — CATH ANGIO OMNI W/10SH 5F .035

## (undated) DEVICE — Device

## (undated) DEVICE — SHEATH DESTINATION 6F X 45CM

## (undated) DEVICE — DEVICE BASIXCOMPAK INFL 20ML

## (undated) DEVICE — CATH EKOSONIC 5.4FR 50X135CM

## (undated) DEVICE — GUIDEWIRE STF .035X260CM ANG

## (undated) DEVICE — KIT MINI STK MAX COAX 5FR 10CM

## (undated) DEVICE — SPONGE COTTON TRAY 4X4IN

## (undated) DEVICE — SET ANGIO ACIST CVI ANGIOTOUCH

## (undated) DEVICE — CANNULA NASAL ADULT

## (undated) DEVICE — GUIDEWIRE INQWIRE .021IN 180CM

## (undated) DEVICE — GUIDEWIRE INQWIRE SE 3MM JTIP

## (undated) DEVICE — GUIDEWIRE GLADIUS STR 300CM

## (undated) DEVICE — SHEATH INTRODUCER 5FR 10CM